# Patient Record
Sex: MALE | Employment: UNEMPLOYED | URBAN - METROPOLITAN AREA
[De-identification: names, ages, dates, MRNs, and addresses within clinical notes are randomized per-mention and may not be internally consistent; named-entity substitution may affect disease eponyms.]

---

## 2023-01-18 ENCOUNTER — OFFICE VISIT (OUTPATIENT)
Dept: FAMILY MEDICINE | Facility: CLINIC | Age: 38
End: 2023-01-18
Payer: MEDICAID

## 2023-01-18 ENCOUNTER — HOSPITAL ENCOUNTER (OUTPATIENT)
Dept: RADIOLOGY | Facility: HOSPITAL | Age: 38
Discharge: HOME | End: 2023-01-18
Payer: MEDICAID

## 2023-01-18 VITALS
SYSTOLIC BLOOD PRESSURE: 115 MMHG | HEIGHT: 72 IN | BODY MASS INDEX: 25.69 KG/M2 | OXYGEN SATURATION: 99 % | HEART RATE: 60 BPM | DIASTOLIC BLOOD PRESSURE: 75 MMHG | RESPIRATION RATE: 18 BRPM | TEMPERATURE: 98 F | WEIGHT: 189.69 LBS

## 2023-01-18 DIAGNOSIS — G89.29 CHRONIC MIDLINE LOW BACK PAIN WITHOUT SCIATICA: ICD-10-CM

## 2023-01-18 DIAGNOSIS — M54.50 CHRONIC MIDLINE LOW BACK PAIN WITHOUT SCIATICA: ICD-10-CM

## 2023-01-18 DIAGNOSIS — G89.29 CHRONIC RIGHT SHOULDER PAIN: ICD-10-CM

## 2023-01-18 DIAGNOSIS — M54.2 CHRONIC NECK PAIN: ICD-10-CM

## 2023-01-18 DIAGNOSIS — Z87.09 HISTORY OF ASTHMA: ICD-10-CM

## 2023-01-18 DIAGNOSIS — Z91.09 ENVIRONMENTAL ALLERGIES: ICD-10-CM

## 2023-01-18 DIAGNOSIS — Z00.00 ENCOUNTER FOR WELLNESS EXAMINATION: Primary | ICD-10-CM

## 2023-01-18 DIAGNOSIS — G89.29 CHRONIC NECK PAIN: ICD-10-CM

## 2023-01-18 DIAGNOSIS — L70.9 ACNE, UNSPECIFIED ACNE TYPE: ICD-10-CM

## 2023-01-18 DIAGNOSIS — M25.511 CHRONIC RIGHT SHOULDER PAIN: ICD-10-CM

## 2023-01-18 PROBLEM — J45.20 MILD INTERMITTENT ASTHMA WITHOUT COMPLICATION: Chronic | Status: ACTIVE | Noted: 2023-01-18

## 2023-01-18 PROBLEM — J45.20 MILD INTERMITTENT ASTHMA WITHOUT COMPLICATION: Status: ACTIVE | Noted: 2023-01-18

## 2023-01-18 LAB
ALBUMIN SERPL-MCNC: 4.6 G/DL (ref 3.5–5)
ALBUMIN/GLOB SERPL: 1.2 RATIO (ref 1.1–2)
ALP SERPL-CCNC: 58 UNIT/L (ref 40–150)
ALT SERPL-CCNC: 28 UNIT/L (ref 0–55)
APPEARANCE UR: CLEAR
AST SERPL-CCNC: 23 UNIT/L (ref 5–34)
BACTERIA #/AREA URNS AUTO: NORMAL /HPF
BASOPHILS # BLD AUTO: 0.02 X10(3)/MCL (ref 0–0.2)
BASOPHILS NFR BLD AUTO: 0.4 %
BILIRUB UR QL STRIP.AUTO: NEGATIVE MG/DL
BILIRUBIN DIRECT+TOT PNL SERPL-MCNC: 0.8 MG/DL
BUN SERPL-MCNC: 9.9 MG/DL (ref 8.9–20.6)
CALCIUM SERPL-MCNC: 10.1 MG/DL (ref 8.4–10.2)
CHLORIDE SERPL-SCNC: 104 MMOL/L (ref 98–107)
CHOLEST SERPL-MCNC: 219 MG/DL
CHOLEST/HDLC SERPL: 5 {RATIO} (ref 0–5)
CO2 SERPL-SCNC: 27 MMOL/L (ref 22–29)
COLOR UR AUTO: NORMAL
CREAT SERPL-MCNC: 0.95 MG/DL (ref 0.73–1.18)
EOSINOPHIL # BLD AUTO: 0.11 X10(3)/MCL (ref 0–0.9)
EOSINOPHIL NFR BLD AUTO: 2.2 %
ERYTHROCYTE [DISTWIDTH] IN BLOOD BY AUTOMATED COUNT: 13.3 % (ref 11.5–17)
EST. AVERAGE GLUCOSE BLD GHB EST-MCNC: 105.4 MG/DL
GFR SERPLBLD CREATININE-BSD FMLA CKD-EPI: >60 MLS/MIN/1.73/M2
GLOBULIN SER-MCNC: 3.7 GM/DL (ref 2.4–3.5)
GLUCOSE SERPL-MCNC: 91 MG/DL (ref 74–100)
GLUCOSE UR QL STRIP.AUTO: NORMAL MG/DL
HAV IGM SERPL QL IA: NONREACTIVE
HBA1C MFR BLD: 5.3 %
HBV CORE IGM SERPL QL IA: NONREACTIVE
HBV SURFACE AG SERPL QL IA: NONREACTIVE
HCT VFR BLD AUTO: 45.2 % (ref 42–52)
HCV AB SERPL QL IA: NONREACTIVE
HDLC SERPL-MCNC: 48 MG/DL (ref 35–60)
HGB BLD-MCNC: 14.9 GM/DL (ref 14–18)
HIV 1+2 AB+HIV1 P24 AG SERPL QL IA: NONREACTIVE
HYALINE CASTS #/AREA URNS LPF: NORMAL /LPF
IMM GRANULOCYTES # BLD AUTO: 0.01 X10(3)/MCL (ref 0–0.04)
IMM GRANULOCYTES NFR BLD AUTO: 0.2 %
KETONES UR QL STRIP.AUTO: NEGATIVE MG/DL
LDLC SERPL CALC-MCNC: 138 MG/DL (ref 50–140)
LEUKOCYTE ESTERASE UR QL STRIP.AUTO: NEGATIVE UNIT/L
LYMPHOCYTES # BLD AUTO: 2.02 X10(3)/MCL (ref 0.6–4.6)
LYMPHOCYTES NFR BLD AUTO: 40.6 %
MCH RBC QN AUTO: 25.2 PG
MCHC RBC AUTO-ENTMCNC: 33 MG/DL (ref 33–36)
MCV RBC AUTO: 76.4 FL (ref 80–94)
MONOCYTES # BLD AUTO: 0.3 X10(3)/MCL (ref 0.1–1.3)
MONOCYTES NFR BLD AUTO: 6 %
NEUTROPHILS # BLD AUTO: 2.51 X10(3)/MCL (ref 2.1–9.2)
NEUTROPHILS NFR BLD AUTO: 50.6 %
NITRITE UR QL STRIP.AUTO: NEGATIVE
NRBC BLD AUTO-RTO: 0 %
PH UR STRIP.AUTO: 6.5 [PH]
PLATELET # BLD AUTO: 299 X10(3)/MCL (ref 130–400)
PMV BLD AUTO: 10.6 FL (ref 7.4–10.4)
POTASSIUM SERPL-SCNC: 3.9 MMOL/L (ref 3.5–5.1)
PROT SERPL-MCNC: 8.3 GM/DL (ref 6.4–8.3)
PROT UR QL STRIP.AUTO: NEGATIVE MG/DL
RBC # BLD AUTO: 5.92 X10(6)/MCL (ref 4.7–6.1)
RBC #/AREA URNS AUTO: NORMAL /HPF
RBC UR QL AUTO: NEGATIVE UNIT/L
SODIUM SERPL-SCNC: 139 MMOL/L (ref 136–145)
SP GR UR STRIP.AUTO: 1.01
SQUAMOUS #/AREA URNS LPF: NORMAL /HPF
T PALLIDUM AB SER QL: NONREACTIVE
T4 FREE SERPL-MCNC: 1.11 NG/DL (ref 0.7–1.48)
TRIGL SERPL-MCNC: 164 MG/DL (ref 34–140)
TSH SERPL-ACNC: 1.12 UIU/ML (ref 0.35–4.94)
UROBILINOGEN UR STRIP-ACNC: NORMAL MG/DL
VLDLC SERPL CALC-MCNC: 33 MG/DL
WBC # SPEC AUTO: 5 X10(3)/MCL (ref 4.5–11.5)
WBC #/AREA URNS AUTO: NORMAL /HPF

## 2023-01-18 PROCEDURE — 73030 X-RAY EXAM OF SHOULDER: CPT | Mod: TC,PN,RT

## 2023-01-18 PROCEDURE — 3008F BODY MASS INDEX DOCD: CPT | Mod: CPTII,,,

## 2023-01-18 PROCEDURE — 1160F PR REVIEW ALL MEDS BY PRESCRIBER/CLIN PHARMACIST DOCUMENTED: ICD-10-PCS | Mod: CPTII,,,

## 2023-01-18 PROCEDURE — 3008F PR BODY MASS INDEX (BMI) DOCUMENTED: ICD-10-PCS | Mod: CPTII,,,

## 2023-01-18 PROCEDURE — 81001 URINALYSIS AUTO W/SCOPE: CPT

## 2023-01-18 PROCEDURE — 3078F DIAST BP <80 MM HG: CPT | Mod: CPTII,,,

## 2023-01-18 PROCEDURE — 1159F PR MEDICATION LIST DOCUMENTED IN MEDICAL RECORD: ICD-10-PCS | Mod: CPTII,,,

## 2023-01-18 PROCEDURE — 86780 TREPONEMA PALLIDUM: CPT

## 2023-01-18 PROCEDURE — 85025 COMPLETE CBC W/AUTO DIFF WBC: CPT

## 2023-01-18 PROCEDURE — 80074 ACUTE HEPATITIS PANEL: CPT

## 2023-01-18 PROCEDURE — 80061 LIPID PANEL: CPT

## 2023-01-18 PROCEDURE — 99385 PREV VISIT NEW AGE 18-39: CPT | Mod: S$PBB,,,

## 2023-01-18 PROCEDURE — 3078F PR MOST RECENT DIASTOLIC BLOOD PRESSURE < 80 MM HG: ICD-10-PCS | Mod: CPTII,,,

## 2023-01-18 PROCEDURE — 84439 ASSAY OF FREE THYROXINE: CPT

## 2023-01-18 PROCEDURE — 84443 ASSAY THYROID STIM HORMONE: CPT

## 2023-01-18 PROCEDURE — 3074F PR MOST RECENT SYSTOLIC BLOOD PRESSURE < 130 MM HG: ICD-10-PCS | Mod: CPTII,,,

## 2023-01-18 PROCEDURE — 99385 PR PREVENTIVE VISIT,NEW,18-39: ICD-10-PCS | Mod: S$PBB,,,

## 2023-01-18 PROCEDURE — 1159F MED LIST DOCD IN RCRD: CPT | Mod: CPTII,,,

## 2023-01-18 PROCEDURE — 80053 COMPREHEN METABOLIC PANEL: CPT

## 2023-01-18 PROCEDURE — 99204 OFFICE O/P NEW MOD 45 MIN: CPT | Mod: PBBFAC,PN

## 2023-01-18 PROCEDURE — 1160F RVW MEDS BY RX/DR IN RCRD: CPT | Mod: CPTII,,,

## 2023-01-18 PROCEDURE — 87389 HIV-1 AG W/HIV-1&-2 AB AG IA: CPT

## 2023-01-18 PROCEDURE — 3074F SYST BP LT 130 MM HG: CPT | Mod: CPTII,,,

## 2023-01-18 PROCEDURE — 36415 COLL VENOUS BLD VENIPUNCTURE: CPT

## 2023-01-18 PROCEDURE — 83036 HEMOGLOBIN GLYCOSYLATED A1C: CPT

## 2023-01-18 RX ORDER — TRETINOIN 1 MG/G
CREAM TOPICAL NIGHTLY
Qty: 45 G | Refills: 2 | Status: SHIPPED | OUTPATIENT
Start: 2023-01-18 | End: 2024-02-05 | Stop reason: SDUPTHER

## 2023-01-18 RX ORDER — FLUTICASONE PROPIONATE 50 MCG
1 SPRAY, SUSPENSION (ML) NASAL DAILY
Qty: 18.2 ML | Refills: 3 | Status: SHIPPED | OUTPATIENT
Start: 2023-01-18

## 2023-01-18 RX ORDER — LORATADINE 10 MG/1
10 TABLET ORAL DAILY
Qty: 30 TABLET | Refills: 3 | Status: SHIPPED | OUTPATIENT
Start: 2023-01-18

## 2023-01-18 RX ORDER — ALBUTEROL SULFATE 90 UG/1
2 AEROSOL, METERED RESPIRATORY (INHALATION) EVERY 6 HOURS PRN
Qty: 18 G | Refills: 6 | Status: SHIPPED | OUTPATIENT
Start: 2023-01-18

## 2023-01-18 NOTE — ASSESSMENT & PLAN NOTE
Continue NSAID/muscle relaxer as needed.   Neck stretches daily.  Avoid activities that make pain and stiffness worse.  Heating pad, Ice pack, and Biofreeze as needed alternate every 15-20 minutes.  Massage neck to loosen muscles

## 2023-01-18 NOTE — ASSESSMENT & PLAN NOTE
Lower back stretches daily.  Avoid strenuous lifting, use proper body mechanics.  Heating pad, Ice pack, Biofreeze or Epsom salt baths as needed.  Exercise to strengthen core muscles to support your back.  PT Referral given.

## 2023-01-18 NOTE — PROGRESS NOTES
Patient Name: Nick Valente   : 1985  MRN: [id number]     Subjective:   Patient ID: Nick Valente is a 37 y.o. male.    Chief Complaint:   Chief Complaint   Patient presents with    [facility]        HPI: 2023:  Patient presents to [facility] today, no recent previous primary care provider.  Patient's last provider was in [place] where he last lived.  Patient was being treated for right shoulder pain while living in [place], states that he had received steroid injections in his right shoulder joint, states that it did provide some relief but the next plan of care was surgical options.  Patient moved and never followed up on this issue.  Patient lives active lifestyle, enjoys surfing.  Notices pain and compensation associated with this injury.  Has never performed physical therapy previously but is open to referral.  As well as right shoulder pain he also complains of neck pain and back pain.  Patient states that at 1 point in time he did report to emergency department for back pain as it had gotten so severe his legs gave out.  No imaging was done at this time.  Patient has not had episode where legs give out but does endorse seeing back pain.  Patient states that at worst his pain is an 8 but is consistently about a 3/10.  Patient utilizes over-the-counter medications currently to help with pain states that it helps provide some relief.  Patient further states that he feels congestion in his right ear, it is most noticeable when he puts on his headphones to play video games as noises are muffled and it feels weird.  Denies any tinnitus or drainage from ear.  Patient also states that he needs albuterol inhaler as his last 1 has  and he likes having 1 just in case.  Patient has not had any complications or had to use albuterol inhaler in 2 years but did have asthma in childhood.  Patient requesting refill of tretinoin when cream.  He has been utilizing this medicine to help with  acne.      ROS:  Review of Systems   Constitutional:  Negative for chills, fever and weight loss.   HENT:  Positive for congestion and ear pain. Negative for ear discharge, nosebleeds and tinnitus.    Eyes:  Negative for blurred vision, photophobia and pain.   Respiratory:  Negative for cough, shortness of breath, wheezing and stridor.    Cardiovascular:  Negative for chest pain, palpitations and orthopnea.   Gastrointestinal:  Negative for abdominal pain, heartburn and nausea.   Genitourinary:  Negative for dysuria, frequency, hematuria and urgency.   Musculoskeletal:  Positive for joint pain. Negative for falls and myalgias.   Skin:  Negative for itching and rash.   Neurological:  Negative for dizziness, sensory change, speech change, focal weakness, seizures, weakness and headaches.   Endo/Heme/Allergies:  Negative for environmental allergies. Does not bruise/bleed easily.   Psychiatric/Behavioral:  Negative for hallucinations and suicidal ideas.     History:   History reviewed. No pertinent past medical history.   History reviewed. No pertinent surgical history.  History reviewed. No pertinent family history.   Social History     Tobacco Use    Smoking status: Never    Smokeless tobacco: Never   Substance and Sexual Activity    Alcohol use: Not Currently    Drug use: Never    Sexual activity: Not Currently     Partners: Female        Allergies: Review of patient's allergies indicates:  No Known Allergies  Objective:     Vitals:    01/18/23 1327   BP: 115/75   Pulse: 60   Resp: 18   Temp: 98 °F (36.7 °C)   SpO2: 99%   Weight: 86 kg (189 lb 11.2 oz)   Height: 6' (1.829 m)     Body mass index is 25.73 kg/m².     Physical Examination:   Physical Exam  Vitals reviewed.   Constitutional:       Appearance: Normal appearance. He is normal weight.   HENT:      Head: Normocephalic.      Right Ear: Tympanic membrane, ear canal and external ear normal.      Left Ear: Tympanic membrane, ear canal and external ear normal.       Nose: Nose normal.      Mouth/Throat:      Mouth: Mucous membranes are moist.      Pharynx: Oropharynx is clear.   Eyes:      Extraocular Movements: Extraocular movements intact.      Conjunctiva/sclera: Conjunctivae normal.      Pupils: Pupils are equal, round, and reactive to light.   Cardiovascular:      Rate and Rhythm: Normal rate and regular rhythm.      Pulses: Normal pulses.      Heart sounds: Normal heart sounds.   Pulmonary:      Effort: Pulmonary effort is normal.      Breath sounds: Normal breath sounds.   Abdominal:      General: Abdomen is flat. Bowel sounds are normal.      Palpations: Abdomen is soft.   Musculoskeletal:      Right shoulder: Crepitus present. Decreased range of motion. Normal strength.      Left shoulder: Normal strength.      Cervical back: Normal range of motion and neck supple. Pain with movement present.      Lumbar back: Spasms present. Decreased range of motion. Negative right straight leg raise test and negative left straight leg raise test.   Skin:     General: Skin is warm and dry.   Neurological:      General: No focal deficit present.      Mental Status: He is alert and oriented to person, place, and time.   Psychiatric:         Mood and Affect: Mood normal.         Behavior: Behavior normal.       Assessment:     Problem List Items Addressed This Visit          Pulmonary    History of asthma (Chronic)    Current Assessment & Plan     Refill of albuterol sent to pharmacy.  Patient's last albuterol inhaler  previously.         Relevant Medications    albuterol (VENTOLIN HFA) 90 mcg/actuation inhaler       Orthopedic    Chronic neck pain (Chronic)    Current Assessment & Plan     Continue NSAID/muscle relaxer as needed.   Neck stretches daily.  Avoid activities that make pain and stiffness worse.  Heating pad, Ice pack, and Biofreeze as needed alternate every 15-20 minutes.  Massage neck to loosen muscles           Relevant Orders    Ambulatory referral/consult to  Physical/Occupational Therapy    Chronic low back pain (Chronic)    Current Assessment & Plan     Lower back stretches daily.  Avoid strenuous lifting, use proper body mechanics.  Heating pad, Ice pack, Biofreeze or Epsom salt baths as needed.  Exercise to strengthen core muscles to support your back.  PT Referral given.             Relevant Orders    Ambulatory referral/consult to Physical/Occupational Therapy    Chronic right shoulder pain (Chronic)    Current Assessment & Plan     Take pain medications as prescribed.  Stay active. Having strong muscles takes the strain off of your joints, which can help reduce your pain.  Rest for several minutes when your pain is at its worst.  Alternate hot and cold packs as needed for pain relief.           Relevant Orders    Ambulatory referral/consult to Physical/Occupational Therapy    X-ray Shoulder 2 or More Views Right    Ambulatory referral/consult to Orthopedics       Other    Environmental allergies (Chronic)    Relevant Medications    fluticasone propionate (FLONASE) 50 mcg/actuation nasal spray    loratadine (CLARITIN) 10 mg tablet     Other Visit Diagnoses       Encounter for wellness examination    -  Primary    Relevant Orders    TSH    T4, Free    Hemoglobin A1C    SYPHILIS ANTIBODY (WITH REFLEX RPR)    Hepatitis Panel, Acute    Lipid Panel    CBC Auto Differential    Comprehensive Metabolic Panel    HIV 1/2 Ag/Ab (4th Gen)    Urinalysis, Reflex to Urine Culture    Acne, unspecified acne type        Relevant Medications    tretinoin (RETIN-A) 0.1 % cream            Plan:   Nick was seen today for establish care.    Diagnoses and all orders for this visit:    Encounter for wellness examination  -     TSH  -     T4, Free  -     Hemoglobin A1C  -     SYPHILIS ANTIBODY (WITH REFLEX RPR)  -     Hepatitis Panel, Acute  -     Lipid Panel  -     CBC Auto Differential  -     Comprehensive Metabolic Panel  -     HIV 1/2 Ag/Ab (4th Gen)  -     Urinalysis, Reflex to Urine  Culture    Chronic neck pain  -     Ambulatory referral/consult to Physical/Occupational Therapy; Future    Chronic midline low back pain without sciatica  -     Ambulatory referral/consult to Physical/Occupational Therapy; Future    Chronic right shoulder pain  -     Ambulatory referral/consult to Physical/Occupational Therapy; Future  -     X-ray Shoulder 2 or More Views Right  -     Ambulatory referral/consult to Orthopedics; Future    History of asthma  -     albuterol (VENTOLIN HFA) 90 mcg/actuation inhaler; Inhale 2 puffs into the lungs every 6 (six) hours as needed for Wheezing. Rescue    Environmental allergies  -     fluticasone propionate (FLONASE) 50 mcg/actuation nasal spray; 1 spray (50 mcg total) by Each Nostril route once daily.  -     loratadine (CLARITIN) 10 mg tablet; Take 1 tablet (10 mg total) by mouth once daily.    Acne, unspecified acne type  -     tretinoin (RETIN-A) 0.1 % cream; Apply topically every evening.       Follow up in about 2 weeks (around 2/1/2023) for review labs.     This note was created with the assistance of Dragon voice recognition software or phone dictation. There may be transcription errors as a result of using this technology however minimal. Effort has been made to assure accuracy of transcription but any obvious errors or omissions should be clarified with the author of the document

## 2023-01-18 NOTE — ASSESSMENT & PLAN NOTE
Take pain medications as prescribed.  Stay active. Having strong muscles takes the strain off of your joints, which can help reduce your pain.  Rest for several minutes when your pain is at its worst.  Alternate hot and cold packs as needed for pain relief.

## 2023-01-20 LAB — PATH REV: NORMAL

## 2023-02-06 ENCOUNTER — OFFICE VISIT (OUTPATIENT)
Dept: FAMILY MEDICINE | Facility: CLINIC | Age: 38
End: 2023-02-06
Payer: MEDICAID

## 2023-02-06 VITALS
RESPIRATION RATE: 20 BRPM | WEIGHT: 185.13 LBS | TEMPERATURE: 98 F | HEIGHT: 72 IN | BODY MASS INDEX: 25.07 KG/M2 | HEART RATE: 68 BPM | OXYGEN SATURATION: 97 % | DIASTOLIC BLOOD PRESSURE: 83 MMHG | SYSTOLIC BLOOD PRESSURE: 118 MMHG

## 2023-02-06 DIAGNOSIS — G89.29 CHRONIC PAIN OF RIGHT KNEE: Primary | ICD-10-CM

## 2023-02-06 DIAGNOSIS — Z98.890 HISTORY OF REPAIR OF ACL: ICD-10-CM

## 2023-02-06 DIAGNOSIS — H93.12 TINNITUS OF LEFT EAR: ICD-10-CM

## 2023-02-06 DIAGNOSIS — M25.561 CHRONIC PAIN OF RIGHT KNEE: Primary | ICD-10-CM

## 2023-02-06 PROCEDURE — 1159F MED LIST DOCD IN RCRD: CPT | Mod: CPTII,,,

## 2023-02-06 PROCEDURE — 1160F RVW MEDS BY RX/DR IN RCRD: CPT | Mod: CPTII,,,

## 2023-02-06 PROCEDURE — 99214 OFFICE O/P EST MOD 30 MIN: CPT | Mod: PBBFAC,PN

## 2023-02-06 PROCEDURE — 3074F PR MOST RECENT SYSTOLIC BLOOD PRESSURE < 130 MM HG: ICD-10-PCS | Mod: CPTII,,,

## 2023-02-06 PROCEDURE — 1160F PR REVIEW ALL MEDS BY PRESCRIBER/CLIN PHARMACIST DOCUMENTED: ICD-10-PCS | Mod: CPTII,,,

## 2023-02-06 PROCEDURE — 99213 OFFICE O/P EST LOW 20 MIN: CPT | Mod: S$PBB,,,

## 2023-02-06 PROCEDURE — 3008F PR BODY MASS INDEX (BMI) DOCUMENTED: ICD-10-PCS | Mod: CPTII,,,

## 2023-02-06 PROCEDURE — 3008F BODY MASS INDEX DOCD: CPT | Mod: CPTII,,,

## 2023-02-06 PROCEDURE — 3079F PR MOST RECENT DIASTOLIC BLOOD PRESSURE 80-89 MM HG: ICD-10-PCS | Mod: CPTII,,,

## 2023-02-06 PROCEDURE — 1159F PR MEDICATION LIST DOCUMENTED IN MEDICAL RECORD: ICD-10-PCS | Mod: CPTII,,,

## 2023-02-06 PROCEDURE — 3079F DIAST BP 80-89 MM HG: CPT | Mod: CPTII,,,

## 2023-02-06 PROCEDURE — 99213 PR OFFICE/OUTPT VISIT, EST, LEVL III, 20-29 MIN: ICD-10-PCS | Mod: S$PBB,,,

## 2023-02-06 PROCEDURE — 3074F SYST BP LT 130 MM HG: CPT | Mod: CPTII,,,

## 2023-02-06 NOTE — PROGRESS NOTES
Patient Name: Nick Valente   : 1985  MRN: [id number]     Subjective:   Patient ID: Nick Valente is a 37 y.o. male.    Chief Complaint:   Chief Complaint   Patient presents with    Follow-up        HPI: 2023:  Patient presents today to review labs, discussed result with patient and all questions answered to patient's understanding.  Patient starts physical therapy today for shoulder and low back pain, we will be adding referral for right knee pain.  Patient has history of ACL repair when he was 16 years old, states that his knee feels strong but he gets nervous when he runs because he feels lots of pain and knee after.  Denies any twisting, turning, pulling or sharp shooting pains and knee.  Patient states since utilizing allergy medications that he notices improvement in congestion and muffled hearing in ear, does endorse tinnitus of left ear.  He is amenable to audiology referral today.    2023:  Patient presents to establish care today, no recent previous primary care provider.  Patient's last provider was in [place] where he last lived.  Patient was being treated for right shoulder pain while living in [place], states that he had received steroid injections in his right shoulder joint, states that it did provide some relief but the next plan of care was surgical options.  Patient moved and never followed up on this issue.  Patient lives active lifestyle, enjoys surfing.  Notices pain and compensation associated with this injury.  Has never performed physical therapy previously but is open to referral.  As well as right shoulder pain he also complains of neck pain and back pain.  Patient states that at 1 point in time he did report to emergency department for back pain as it had gotten so severe his legs gave out.  No imaging was done at this time.  Patient has not had episode where legs give out but does endorse seeing back pain.  Patient states that at worst his pain is an 8 but is consistently  about a 3/10.  Patient utilizes over-the-counter medications currently to help with pain states that it helps provide some relief.  Patient further states that he feels congestion in his right ear, it is most noticeable when he puts on his headphones to play video games as noises are muffled and it feels weird.  Denies any tinnitus or drainage from ear.  Patient also states that he needs albuterol inhaler as his last 1 has  and he likes having 1 just in case.  Patient has not had any complications or had to use albuterol inhaler in 2 years but did have asthma in childhood.  Patient requesting refill of tretinoin when cream.  He has been utilizing this medicine to help with acne.      ROS:  Review of Systems   Constitutional:  Negative for chills, fever and weight loss.   HENT:  Positive for ear pain and tinnitus. Negative for ear discharge and nosebleeds.    Eyes:  Negative for blurred vision, photophobia and pain.   Respiratory:  Negative for cough, shortness of breath, wheezing and stridor.    Cardiovascular:  Negative for chest pain, palpitations and orthopnea.   Gastrointestinal:  Negative for abdominal pain, heartburn and nausea.   Genitourinary:  Negative for dysuria, frequency, hematuria and urgency.   Musculoskeletal:  Negative for falls and myalgias.   Skin:  Negative for itching and rash.   Neurological:  Negative for dizziness, sensory change, speech change, focal weakness, seizures, weakness and headaches.   Endo/Heme/Allergies:  Negative for environmental allergies. Does not bruise/bleed easily.   Psychiatric/Behavioral:  Negative for hallucinations and suicidal ideas.     History:   History reviewed. No pertinent past medical history.   History reviewed. No pertinent surgical history.  History reviewed. No pertinent family history.   Social History     Tobacco Use    Smoking status: Never    Smokeless tobacco: Never   Substance and Sexual Activity    Alcohol use: Not Currently    Drug use: Never     Sexual activity: Not Currently     Partners: Female        Allergies: Review of patient's allergies indicates:  No Known Allergies  Objective:     Vitals:    02/06/23 1333   BP: 118/83   Pulse: 68   Resp: 20   Temp: 98.4 °F (36.9 °C)   SpO2: 97%   Weight: 84 kg (185 lb 1.6 oz)   Height: 6' (1.829 m)     Body mass index is 25.1 kg/m².     Physical Examination:   Physical Exam  Constitutional:       General: He is not in acute distress.     Appearance: Normal appearance. He is not ill-appearing.   HENT:      Right Ear: Tympanic membrane, ear canal and external ear normal. There is no impacted cerumen.      Left Ear: Tympanic membrane, ear canal and external ear normal. There is no impacted cerumen.   Cardiovascular:      Rate and Rhythm: Normal rate and regular rhythm.      Heart sounds: Normal heart sounds.   Pulmonary:      Effort: Pulmonary effort is normal. No respiratory distress.      Breath sounds: Normal breath sounds.   Musculoskeletal:      Right shoulder: Decreased range of motion.      Cervical back: Normal range of motion.      Lumbar back: Spasms present. Decreased range of motion.      Right knee: Normal.      Left knee: Normal.   Skin:     General: Skin is warm and dry.   Neurological:      Mental Status: He is alert and oriented to person, place, and time.   Psychiatric:         Mood and Affect: Mood normal.         Behavior: Behavior normal.       Assessment:     Problem List Items Addressed This Visit    None  Visit Diagnoses       Chronic pain of right knee    -  Primary    Relevant Orders    Ambulatory referral/consult to Physical/Occupational Therapy    History of repair of ACL        Relevant Orders    Ambulatory referral/consult to Physical/Occupational Therapy    Tinnitus of left ear        Relevant Orders    Ambulatory referral/consult to Audiology            Plan:   Nick was seen today for follow-up.    Diagnoses and all orders for this visit:    Chronic pain of right knee  -      Ambulatory referral/consult to Physical/Occupational Therapy; Future    History of repair of ACL  -     Ambulatory referral/consult to Physical/Occupational Therapy; Future    Tinnitus of left ear  -     Ambulatory referral/consult to Audiology; Future       Follow up in January of 2024 for annual exam, Sooner if needed.     This note was created with the assistance of Dragon voice recognition software or phone dictation. There may be transcription errors as a result of using this technology however minimal. Effort has been made to assure accuracy of transcription but any obvious errors or omissions should be clarified with the author of the document      I spent a total of 25 minutes on the day of the visit.This includes face to face time and non-face to face time preparing to see the patient (eg, review of tests), obtaining and/or reviewing separately obtained history, documenting clinical information in the electronic or other health record, independently interpreting results and communicating results to the patient/family/caregiver, or care coordinator.

## 2023-03-13 ENCOUNTER — HOSPITAL ENCOUNTER (OUTPATIENT)
Dept: RADIOLOGY | Facility: HOSPITAL | Age: 38
Discharge: HOME | End: 2023-03-13
Attending: STUDENT IN AN ORGANIZED HEALTH CARE EDUCATION/TRAINING PROGRAM
Payer: MEDICAID

## 2023-03-13 ENCOUNTER — OFFICE VISIT (OUTPATIENT)
Dept: ORTHOPEDICS | Facility: CLINIC | Age: 38
End: 2023-03-13
Payer: MEDICAID

## 2023-03-13 VITALS
SYSTOLIC BLOOD PRESSURE: 119 MMHG | DIASTOLIC BLOOD PRESSURE: 76 MMHG | WEIGHT: 191.19 LBS | BODY MASS INDEX: 25.9 KG/M2 | HEART RATE: 64 BPM | HEIGHT: 72 IN

## 2023-03-13 DIAGNOSIS — G89.29 CHRONIC RIGHT SHOULDER PAIN: ICD-10-CM

## 2023-03-13 DIAGNOSIS — M75.41 SUBACROMIAL IMPINGEMENT OF RIGHT SHOULDER: Primary | ICD-10-CM

## 2023-03-13 DIAGNOSIS — M25.511 CHRONIC RIGHT SHOULDER PAIN: ICD-10-CM

## 2023-03-13 PROCEDURE — 96372 THER/PROPH/DIAG INJ SC/IM: CPT | Mod: PBBFAC

## 2023-03-13 PROCEDURE — 73030 X-RAY EXAM OF SHOULDER: CPT | Mod: TC,RT

## 2023-03-13 PROCEDURE — 99213 OFFICE O/P EST LOW 20 MIN: CPT | Mod: PBBFAC

## 2023-03-13 PROCEDURE — 20610 DRAIN/INJ JOINT/BURSA W/O US: CPT | Mod: PBBFAC | Performed by: STUDENT IN AN ORGANIZED HEALTH CARE EDUCATION/TRAINING PROGRAM

## 2023-03-13 RX ORDER — TRIAMCINOLONE ACETONIDE 40 MG/ML
40 INJECTION, SUSPENSION INTRA-ARTICULAR; INTRAMUSCULAR ONCE
Status: COMPLETED | OUTPATIENT
Start: 2023-03-13 | End: 2023-03-13

## 2023-03-13 RX ORDER — LIDOCAINE HYDROCHLORIDE 10 MG/ML
5 INJECTION, SOLUTION EPIDURAL; INFILTRATION; INTRACAUDAL; PERINEURAL
Status: COMPLETED | OUTPATIENT
Start: 2023-03-13 | End: 2023-03-13

## 2023-03-13 RX ADMIN — LIDOCAINE HYDROCHLORIDE 50 MG: 10 INJECTION, SOLUTION EPIDURAL; INFILTRATION; INTRACAUDAL; PERINEURAL at 01:03

## 2023-03-13 RX ADMIN — TRIAMCINOLONE ACETONIDE 40 MG: 40 INJECTION, SUSPENSION INTRA-ARTICULAR; INTRAMUSCULAR at 01:03

## 2023-03-13 NOTE — PROGRESS NOTES
Subjective:    Patient ID: Nick Valente is a right handed 37 y.o. male  who presented to [facility] for new visit..      Chief Complaint: Pain of the Right Shoulder      History of Present Illness:  Nick Valente who has a history of rotator cuff tendinopathy  presented today for  shoulder pain involving the right for the past 1 year +. Pain is located at lateral acromion, deltoid muscle. Quality of pain is described as aching.  Inciting event:  Feels like pain started with him doing shoulder presses years ago .  Pain is aggravated by reaching, repetitive use, work at or above shoulder height, swimming / surfing . Patient has had prior shoulder problems. Evaluation to date: plain films and Ortho evaluation. Treatment to date: avoidance of activity, oral analgesics, and CSI. Expectations for today's visit includes: formal evaluation.  Occupation includes: physician. PCP is: Alecia Mg NP.    Shoulder Review of Systems:  Swelling?  no  Instability?  no  Clicking?  no  Limited ROM? no  Fever/Chills? no  Subluxation? no  Dislocation? no    Current Choice of Exercise: Swimming / Surfing / Weight Lifting      Objective:      Physical Exam:  /76   Pulse 64   Ht 6' (1.829 m)   Wt 86.7 kg (191 lb 3.2 oz)   BMI 25.93 kg/m²     Appearance:  Soft tissue swelling: Left: no Right: no  Effusion: Left:  Negative Right: Negative  Erythema: Left no Right: no  Ecchymosis: Left: no Right: no  Atrophy: Left: no Right: no  Scapular winging: Left: no Right: no    Palpation:  Shoulder Tenderness: Left: none  Right: lateral acromion, deltoid muscle    Range of motion:  Flexion (0-90): Left:  90 Right: 90  Abduction (0-180): Left:  180 Right: 180  External rotation (0-55): Left: 55 Right: 55  Internal rotation (0-45): Left: 45 Left: 45    Strength:  Abduction: Left: 5/5 Pain: no Right: 5/5 Pain: yes  External rotation: Left: 5/5 Pain: no Right: 5/5 Pain: no  Internal  rotation: Left: 5/5 Pain: no Right: 5/5 Pain: no  Elbow flexion: Left: 5/5 Pain: no Right: 5/5 Pain: no  Elbow extension: Left: 5/5 Pain: no Right: 5/5 Pain: no    Special Tests:  Subacromial Impingement  Neer: Left: Negative Right: Positive  Sofia: Left: Negative Right: Positive    AC Joint Arthritis:  Cross-body abduction: Left: Negative Right: Negative    Rotator Cuff Tear   Drop arm test: Left: Negative Right: Negative  Hornblower: Left: Left: Not performed Right: Not performed   Belly press test: Left: Negative Right: Negative  Jia test (Empty can): Left: Negative Right: Negative    Biceps tendon/Labral tear  Speed's: Left: Negative Right: Negative  Yergason's: Left: Negative Right: Negative  O'Favio's: Left: Negative Right: Negative  Cranck test: Left: Negative Right: Negative    Stability   Sulcus sign: Left: Not performed Right: Not performed   Apprehension test: Left: Not performed Right: Not performed   Relocation test: Left: Not performed Right: Not performed     Cervical   Spurling: Left: Negative Right: Negative    AIN/PIN/Radial nerve: Intact and symmetric    General appearance: NAD  Peripheral pulses: normal bilaterally   Reflexes: Left: Not performed Right: Not performed   Sensation: normal    Labs:  Last A1c: The patient doesn't have any registry metric data available     Imaging:   Previous images reviewed.  X-rays ordered and performed today: yes  # of views: 4 Laterality: right  My Interpretation:  No acute fracture or dislocation. Preserved GH joint space without any cortical irregularities or osteophytes.      Assessment:        Encounter Diagnoses   Code Name Primary?    M75.41 Subacromial impingement of right shoulder Yes    M25.511, G89.29 Chronic right shoulder pain           Plan:           Orders Placed This Encounter   Procedures    Large Joint Aspiration/Injection     This order was created via procedure documentation    X-ray Shoulder 2 or More Views Right     Standing Status:    Future     Number of Occurrences:   1     Standing Expiration Date:   3/13/2024     Order Specific Question:   May the Radiologist modify the order per protocol to meet the clinical needs of the patient?     Answer:   Yes     Order Specific Question:   Release to patient     Answer:   Immediate     Medications Ordered This Encounter   Medications    LIDOcaine (PF) 10 mg/ml (1%) injection 50 mg    triamcinolone acetonide injection 40 mg     Dx: right subacromial impingement Acute in moderate exacerbation.   Treatment Plan: Discussed with patient diagnosis and treatment recommendations. Natural history and expected course discussed. Questions answered.  Educational material distributed.  Reduction in offending activity.  Gentle ROM exercises.  Plain film x-rays.  Shoulder injection. See procedure note.  Home physical therapy exercise handouts provided to patient.   formal PT script provided to patient. You may take this script to whichever physical therapist you would like to go to.   Over the counter NSAID and/or tylenol provided you do not have contraindications such as but not limited to liver or kidney disease or uncontrolled blood pressure. If you're doctors have told you to to not take them based on your health, do not take them.   Imaging: radiological studies ordered and independently reviewed; discussed with patient; pending radiologist interpretation.   Procedure: Discussed CSI/VSI as treatment options; discussed CSI vs VS injections as treatment options; since conservative measures did not improve symptoms patient consented for CSI today.  Therapy: Physical Therapy  RTC: 3 months.         Large Joint Aspiration/Injection: SA/SD injection: R subacromial bursa    Date/Time: 3/13/2023 1:00 PM  Performed by: Pebbles Mejía MD  Authorized by: Pebbles Mejía MD     Consent Done?:  Yes (Written)  Indications:  Diagnostic evaluation  Site marked: the procedure site was marked    Timeout: prior to procedure the  correct patient, procedure, and site was verified    Prep: patient was prepped and draped in usual sterile fashion      Local anesthesia used?: Yes    Local anesthetic:  Topical anesthetic    Details:  Needle Size:  21 G  Imaging guidance used: YES.  Approach:  Anterolateral  Location:  Shoulder  Site:  R subacromial bursa  Patient tolerance:  Patient tolerated the procedure well with no immediate complications     Staff: Bob Encarnacion MD     Risks:  Possible complications with the injection include bleeding, infection (.01%), tendon rupture, steroid flare, fat pad or soft tissue atrophy, skin depigmentation, allergic reaction to medications and vasovagal response. (steroid flare treatment is rest, ice, NSAIDs and resolves in 24-36 hours.)    Consent:  No absolute contraindications (cellulitis overlying joint, infection, lack of informed consent, allergy to injection medication, AVN protein or egg allergy for sodium hyaluronate, or history of steroid flare) or relative contraindications (uncontrolled DM2 A1c>10, coagulopathy, INR > 3.5, previous joint replacement or history of AVN).        Description:  The patient was prepped in normal sterile fashion use of chlorhexidine scrub and the appropriate and anatomic landmarks were identified with ultrasound.  Contents of syringe included: 5cc of 1% of lidocaine with 40mg of Kenalog     Post Procedure: Patient alert, and moving all extremities. ROM improved, pain decreased.  Good peripheral pulses, no signs of vascular compromise and range of motion intact.  Aftercare instructions were given to patient at time of discharge.  Relative rest for 3 days-avoiding excess activity.  Place ice on the area for 15 minutes every 4-6 hours. Patient may take Tylenol a 1000 mg b.i.d. or ibuprofen 600 mg t.i.d. for the next 3-4 days if not on medication already and safe to take pending co-morbidities.  Protect the area for the next 1-8 hours if anesthetic was used.  Avoid excessive  activity for the next 3-4 weeks.  ER precautions given for fever, severe joint pain or allergic reaction or other new symptoms related to the joint injection.       Pebbles Mejía MD  Sports Medicine Fellow

## 2023-04-10 ENCOUNTER — PATIENT MESSAGE (OUTPATIENT)
Dept: FAMILY MEDICINE | Facility: CLINIC | Age: 38
End: 2023-04-10
Payer: MEDICAID

## 2023-04-10 DIAGNOSIS — M54.9 DORSALGIA, UNSPECIFIED: Primary | ICD-10-CM

## 2023-04-14 ENCOUNTER — PATIENT MESSAGE (OUTPATIENT)
Dept: FAMILY MEDICINE | Facility: CLINIC | Age: 38
End: 2023-04-14
Payer: MEDICAID

## 2023-04-14 NOTE — TELEPHONE ENCOUNTER
Received patients physical therapy paperwork, they have started treatment on 3/21/23 with plans to go twice a week for 6 weeks. He completed PT on 4/17/23 and is still complaining of lower back pain. Will order MRI L spine

## 2024-02-05 ENCOUNTER — OFFICE VISIT (OUTPATIENT)
Dept: FAMILY MEDICINE | Facility: CLINIC | Age: 39
End: 2024-02-05
Payer: MEDICAID

## 2024-02-05 ENCOUNTER — HOSPITAL ENCOUNTER (OUTPATIENT)
Dept: RADIOLOGY | Facility: HOSPITAL | Age: 39
Discharge: HOME | End: 2024-02-05
Payer: MEDICAID

## 2024-02-05 VITALS
HEIGHT: 72 IN | WEIGHT: 179.38 LBS | DIASTOLIC BLOOD PRESSURE: 77 MMHG | OXYGEN SATURATION: 98 % | HEART RATE: 62 BPM | BODY MASS INDEX: 24.3 KG/M2 | SYSTOLIC BLOOD PRESSURE: 119 MMHG | RESPIRATION RATE: 20 BRPM | TEMPERATURE: 98 F

## 2024-02-05 DIAGNOSIS — M25.561 CHRONIC PAIN OF RIGHT KNEE: ICD-10-CM

## 2024-02-05 DIAGNOSIS — Z98.890 HISTORY OF REPAIR OF ACL: ICD-10-CM

## 2024-02-05 DIAGNOSIS — Z98.890 HISTORY OF REPAIR OF ACL: Primary | ICD-10-CM

## 2024-02-05 DIAGNOSIS — L70.9 ACNE, UNSPECIFIED ACNE TYPE: ICD-10-CM

## 2024-02-05 DIAGNOSIS — G89.29 CHRONIC PAIN OF RIGHT KNEE: ICD-10-CM

## 2024-02-05 DIAGNOSIS — G89.29 CHRONIC MIDLINE LOW BACK PAIN WITHOUT SCIATICA: Chronic | ICD-10-CM

## 2024-02-05 DIAGNOSIS — Z00.00 ENCOUNTER FOR WELLNESS EXAMINATION: ICD-10-CM

## 2024-02-05 DIAGNOSIS — M54.50 CHRONIC MIDLINE LOW BACK PAIN WITHOUT SCIATICA: Chronic | ICD-10-CM

## 2024-02-05 DIAGNOSIS — Z30.09 VASECTOMY EVALUATION: ICD-10-CM

## 2024-02-05 LAB
ALBUMIN SERPL-MCNC: 4.3 G/DL (ref 3.5–5)
ALBUMIN/GLOB SERPL: 1.1 RATIO (ref 1.1–2)
ALP SERPL-CCNC: 56 UNIT/L (ref 40–150)
ALT SERPL-CCNC: 23 UNIT/L (ref 0–55)
APPEARANCE UR: CLEAR
AST SERPL-CCNC: 23 UNIT/L (ref 5–34)
BACTERIA #/AREA URNS AUTO: ABNORMAL /HPF
BASOPHILS # BLD AUTO: 0.04 X10(3)/MCL
BASOPHILS NFR BLD AUTO: 0.8 %
BILIRUB SERPL-MCNC: 0.5 MG/DL
BILIRUB UR QL STRIP.AUTO: NEGATIVE
BUN SERPL-MCNC: 20.1 MG/DL (ref 8.9–20.6)
CALCIUM SERPL-MCNC: 9.6 MG/DL (ref 8.4–10.2)
CHLORIDE SERPL-SCNC: 103 MMOL/L (ref 98–107)
CHOLEST SERPL-MCNC: 209 MG/DL
CHOLEST/HDLC SERPL: 3 {RATIO} (ref 0–5)
CO2 SERPL-SCNC: 30 MMOL/L (ref 22–29)
COLOR UR AUTO: COLORLESS
CREAT SERPL-MCNC: 0.93 MG/DL (ref 0.73–1.18)
DEPRECATED CALCIDIOL+CALCIFEROL SERPL-MC: 29.9 NG/ML (ref 30–80)
EOSINOPHIL # BLD AUTO: 0.27 X10(3)/MCL (ref 0–0.9)
EOSINOPHIL NFR BLD AUTO: 5.6 %
ERYTHROCYTE [DISTWIDTH] IN BLOOD BY AUTOMATED COUNT: 13.7 % (ref 11.5–17)
EST. AVERAGE GLUCOSE BLD GHB EST-MCNC: 93.9 MG/DL
GFR SERPLBLD CREATININE-BSD FMLA CKD-EPI: >60 MLS/MIN/1.73/M2
GLOBULIN SER-MCNC: 3.8 GM/DL (ref 2.4–3.5)
GLUCOSE SERPL-MCNC: 99 MG/DL (ref 74–100)
GLUCOSE UR QL STRIP.AUTO: NORMAL
HBA1C MFR BLD: 4.9 %
HCT VFR BLD AUTO: 47.5 % (ref 42–52)
HDLC SERPL-MCNC: 62 MG/DL (ref 35–60)
HGB BLD-MCNC: 15.8 G/DL (ref 14–18)
HYALINE CASTS #/AREA URNS LPF: ABNORMAL /LPF
IMM GRANULOCYTES # BLD AUTO: 0 X10(3)/MCL (ref 0–0.04)
IMM GRANULOCYTES NFR BLD AUTO: 0 %
KETONES UR QL STRIP.AUTO: NEGATIVE
LDLC SERPL CALC-MCNC: 132 MG/DL (ref 50–140)
LEUKOCYTE ESTERASE UR QL STRIP.AUTO: NEGATIVE
LYMPHOCYTES # BLD AUTO: 2.15 X10(3)/MCL (ref 0.6–4.6)
LYMPHOCYTES NFR BLD AUTO: 45 %
MCH RBC QN AUTO: 26 PG (ref 27–31)
MCHC RBC AUTO-ENTMCNC: 33.3 G/DL (ref 33–36)
MCV RBC AUTO: 78.1 FL (ref 80–94)
MONOCYTES # BLD AUTO: 0.27 X10(3)/MCL (ref 0.1–1.3)
MONOCYTES NFR BLD AUTO: 5.6 %
NEUTROPHILS # BLD AUTO: 2.05 X10(3)/MCL (ref 2.1–9.2)
NEUTROPHILS NFR BLD AUTO: 43 %
NITRITE UR QL STRIP.AUTO: NEGATIVE
NRBC BLD AUTO-RTO: 0 %
PH UR STRIP.AUTO: 6.5 [PH]
PLATELET # BLD AUTO: 287 X10(3)/MCL (ref 130–400)
PMV BLD AUTO: 9.5 FL (ref 7.4–10.4)
POTASSIUM SERPL-SCNC: 4 MMOL/L (ref 3.5–5.1)
PROT SERPL-MCNC: 8.1 GM/DL (ref 6.4–8.3)
PROT UR QL STRIP.AUTO: NEGATIVE
RBC # BLD AUTO: 6.08 X10(6)/MCL (ref 4.7–6.1)
RBC #/AREA URNS AUTO: ABNORMAL /HPF
RBC UR QL AUTO: NEGATIVE
SODIUM SERPL-SCNC: 140 MMOL/L (ref 136–145)
SP GR UR STRIP.AUTO: 1.01 (ref 1–1.03)
SQUAMOUS #/AREA URNS LPF: ABNORMAL /HPF
T4 FREE SERPL-MCNC: 1.1 NG/DL (ref 0.7–1.48)
TRIGL SERPL-MCNC: 75 MG/DL (ref 34–140)
TSH SERPL-ACNC: 1.06 UIU/ML (ref 0.35–4.94)
UROBILINOGEN UR STRIP-ACNC: NORMAL
VLDLC SERPL CALC-MCNC: 15 MG/DL
WBC # SPEC AUTO: 4.78 X10(3)/MCL (ref 4.5–11.5)
WBC #/AREA URNS AUTO: ABNORMAL /HPF

## 2024-02-05 PROCEDURE — 99396 PREV VISIT EST AGE 40-64: CPT | Mod: S$PBB,,,

## 2024-02-05 PROCEDURE — 81001 URINALYSIS AUTO W/SCOPE: CPT

## 2024-02-05 PROCEDURE — 82306 VITAMIN D 25 HYDROXY: CPT

## 2024-02-05 PROCEDURE — 84443 ASSAY THYROID STIM HORMONE: CPT

## 2024-02-05 PROCEDURE — 80053 COMPREHEN METABOLIC PANEL: CPT

## 2024-02-05 PROCEDURE — 3078F DIAST BP <80 MM HG: CPT | Mod: CPTII,,,

## 2024-02-05 PROCEDURE — 36415 COLL VENOUS BLD VENIPUNCTURE: CPT

## 2024-02-05 PROCEDURE — 84439 ASSAY OF FREE THYROXINE: CPT

## 2024-02-05 PROCEDURE — 99215 OFFICE O/P EST HI 40 MIN: CPT | Mod: PBBFAC,PN

## 2024-02-05 PROCEDURE — 85025 COMPLETE CBC W/AUTO DIFF WBC: CPT

## 2024-02-05 PROCEDURE — 1160F RVW MEDS BY RX/DR IN RCRD: CPT | Mod: CPTII,,,

## 2024-02-05 PROCEDURE — 80061 LIPID PANEL: CPT

## 2024-02-05 PROCEDURE — 3008F BODY MASS INDEX DOCD: CPT | Mod: CPTII,,,

## 2024-02-05 PROCEDURE — 83036 HEMOGLOBIN GLYCOSYLATED A1C: CPT

## 2024-02-05 PROCEDURE — 73562 X-RAY EXAM OF KNEE 3: CPT | Mod: TC,PN,RT

## 2024-02-05 PROCEDURE — 3074F SYST BP LT 130 MM HG: CPT | Mod: CPTII,,,

## 2024-02-05 PROCEDURE — 1159F MED LIST DOCD IN RCRD: CPT | Mod: CPTII,,,

## 2024-02-05 RX ORDER — TRETINOIN 1 MG/G
CREAM TOPICAL NIGHTLY
Qty: 45 G | Refills: 2 | Status: SHIPPED | OUTPATIENT
Start: 2024-02-05

## 2024-02-05 NOTE — ASSESSMENT & PLAN NOTE
Take pain medications as prescribed.  Stay active. Having strong muscles takes the strain off of your joints, which can help reduce your pain.  Rest for several minutes when your pain is at its worst.  Goal BMI <30.   Alternate hot and cold packs as needed for pain relief.      Referral to Dr. Wagner per his request (unsure if they take his insurance or not) and to Ortho at [facility] which will take his insurance.

## 2024-02-05 NOTE — PROGRESS NOTES
Patient Name: Nick Valente     : 1985    MRN: [id number]     Subjective:     Patient ID: Nick Valente is a 38 y.o. male.    Chief Complaint:   Chief Complaint   Patient presents with    Follow-up     Patient requesting appt. Requesting referral to orthopedic surgery. Interested in having a vasectomy and if his insurance will pay. Interested in having LASIK eye surgery and if his insurance will pay.         HPI: 2024:  Patient presents to clinic today for routine wellness exam, he is also complaining of need for 3 referrals.  Requesting referral to have LASIK eye surgery, does not currently follow with any eye doctor who will be performing this but would like to know if his insurance will cover it.  He is also requesting a referral for a vasectomy.  States that he has an appointment with Dr. Orona and rojas mclean this month, is not sure if vasectomy is a procedure that will be covered by his insurance.  We will place referral to Urology at [facility] as they do accept his insurance.  Additionally he is complaining of worsening right knee pain, did have ACL surgery many years ago but states he has been more active with surfing and has had difficult time recovering from pain.  Would like referral to be placed to previous orthopedic surgeon who performed surgery in [place], we will attempt to get patient back with this provider pull place referral to orthopedic clinic in [place] the does accept his insurance also. Did previously complete PT for his knee about a year ago. Denies any popping, locking and foreign body sensation. Patient denies chest pain, palpitations, and shortness of breath.  Patient denies fever, night sweats, chills, nausea, vomiting, diarrhea, constipation, weight loss, and changes in appetite.      2023:  Patient presents today to review labs, discussed result with patient and all questions answered to patient's understanding.  Patient starts physical therapy today for shoulder and low  back pain, we will be adding referral for right knee pain.  Patient has history of ACL repair when he was 16 years old, states that his knee feels strong but he gets nervous when he runs because he feels lots of pain and knee after.  Denies any twisting, turning, pulling or sharp shooting pains and knee.  Patient states since utilizing allergy medications that he notices improvement in congestion and muffled hearing in ear, does endorse tinnitus of left ear.  He is amenable to audiology referral today.    01/18/2023:  Patient presents to establish care today, no recent previous primary care provider.  Patient's last provider was in [place] where he last lived.  Patient was being treated for right shoulder pain while living in [place], states that he had received steroid injections in his right shoulder joint, states that it did provide some relief but the next plan of care was surgical options.  Patient moved and never followed up on this issue.  Patient lives active lifestyle, enjoys surfing.  Notices pain and compensation associated with this injury.  Has never performed physical therapy previously but is open to referral.  As well as right shoulder pain he also complains of neck pain and back pain.  Patient states that at 1 point in time he did report to emergency department for back pain as it had gotten so severe his legs gave out.  No imaging was done at this time.  Patient has not had episode where legs give out but does endorse seeing back pain.  Patient states that at worst his pain is an 8 but is consistently about a 3/10.  Patient utilizes over-the-counter medications currently to help with pain states that it helps provide some relief.  Patient further states that he feels congestion in his right ear, it is most noticeable when he puts on his headphones to play video games as noises are muffled and it feels weird.  Denies any tinnitus or drainage from ear.  Patient also states that he needs albuterol  inhaler as his last 1 has  and he likes having 1 just in case.  Patient has not had any complications or had to use albuterol inhaler in 2 years but did have asthma in childhood.  Patient requesting refill of tretinoin when cream.  He has been utilizing this medicine to help with acne.        ROS:        12 point review of systems conducted, negative except as stated in the history of present illness. See HPI for details.    History:     History reviewed. No pertinent past medical history.     History reviewed. No pertinent surgical history.    History reviewed. No pertinent family history.     Social History     Tobacco Use    Smoking status: Never    Smokeless tobacco: Never   Substance and Sexual Activity    Alcohol use: Not Currently    Drug use: Never    Sexual activity: Not Currently     Partners: Female       Current Outpatient Medications   Medication Instructions    albuterol (VENTOLIN HFA) 90 mcg/actuation inhaler 2 puffs, Inhalation, Every 6 hours PRN, Rescue    fluticasone propionate (FLONASE) 50 mcg, Each Nostril, Daily    loratadine (CLARITIN) 10 mg, Oral, Daily    tretinoin (RETIN-A) 0.1 % cream Topical (Top), Nightly        Review of patient's allergies indicates:  No Known Allergies    Objective:     Visit Vitals  /77 (BP Location: Right arm, Patient Position: Sitting)   Pulse 62   Temp 97.8 °F (36.6 °C) (Oral)   Resp 20   Ht 6' (1.829 m)   Wt 81.4 kg (179 lb 6.4 oz)   SpO2 98%   BMI 24.33 kg/m²       Physical Examination:     Physical Exam  Constitutional:       General: He is not in acute distress.     Appearance: Normal appearance. He is not ill-appearing.   Cardiovascular:      Rate and Rhythm: Normal rate and regular rhythm.      Heart sounds: Normal heart sounds.   Pulmonary:      Effort: Pulmonary effort is normal. No respiratory distress.      Breath sounds: Normal breath sounds.   Musculoskeletal:      Cervical back: Normal range of motion.      Right knee: No swelling,  deformity, effusion or erythema. Normal range of motion.      Left knee: Normal.   Skin:     General: Skin is warm and dry.   Neurological:      Mental Status: He is alert and oriented to person, place, and time.   Psychiatric:         Mood and Affect: Mood normal.         Behavior: Behavior normal.         Lab Results:     Chemistry:  Lab Results   Component Value Date     01/18/2023    K 3.9 01/18/2023    CHLORIDE 104 01/18/2023    BUN 9.9 01/18/2023    CREATININE 0.95 01/18/2023    EGFRNORACEVR >60 01/18/2023    GLUCOSE 91 01/18/2023    CALCIUM 10.1 01/18/2023    ALKPHOS 58 01/18/2023    LABPROT 8.3 01/18/2023    ALBUMIN 4.6 01/18/2023    AST 23 01/18/2023    ALT 28 01/18/2023    TSH 1.122 01/18/2023    TNYXGU1YYCO 1.11 01/18/2023        Lab Results   Component Value Date    HGBA1C 5.3 01/18/2023        Hematology:  Lab Results   Component Value Date    WBC 5.0 01/18/2023    HGB 14.9 01/18/2023    HCT 45.2 01/18/2023     01/18/2023       Lipid Panel:  Lab Results   Component Value Date    CHOL 219 (H) 01/18/2023    HDL 48 01/18/2023    .00 01/18/2023    TRIG 164 (H) 01/18/2023    TOTALCHOLEST 5 01/18/2023        Urine:  Lab Results   Component Value Date    COLORUA Light-Yellow 01/18/2023    APPEARANCEUA Clear 01/18/2023    SGUA 1.011 01/18/2023    PHUA 6.5 01/18/2023    PROTEINUA Negative 01/18/2023    GLUCOSEUA Normal 01/18/2023    KETONESUA Negative 01/18/2023    BLOODUA Negative 01/18/2023    NITRITESUA Negative 01/18/2023    LEUKOCYTESUR Negative 01/18/2023    RBCUA 0-5 01/18/2023    WBCUA None Seen 01/18/2023    BACTERIA None Seen 01/18/2023    SQEPUA None Seen 01/18/2023    HYALINECASTS None Seen 01/18/2023        Assessment:          ICD-10-CM ICD-9-CM   1. History of repair of ACL  Z98.890 V45.89   2. Acne, unspecified acne type  L70.9 706.1   3. Chronic pain of right knee  M25.561 719.46    G89.29 338.29   4. Vasectomy evaluation  Z30.09 V25.09   5. Encounter for wellness examination   Z00.00 V70.0   6. Chronic midline low back pain without sciatica  M54.50 724.2    G89.29 338.29        Plan:     1. History of repair of ACL  Comments:  Patient requested referral to previous provider, orthopedic surgeon Dr. Wagner fixed ACL  Orders:  -     X-Ray Knee 3 View Right; Future; Expected date: 02/05/2024  -     Ambulatory referral/consult to Orthopedics; Future; Expected date: 02/05/2024  -     Ambulatory referral/consult to Orthopedics; Future; Expected date: 02/05/2024    2. Acne, unspecified acne type  -     tretinoin (RETIN-A) 0.1 % cream; Apply topically every evening.  Dispense: 45 g; Refill: 2    3. Chronic pain of right knee  Assessment & Plan:  Take pain medications as prescribed.  Stay active. Having strong muscles takes the strain off of your joints, which can help reduce your pain.  Rest for several minutes when your pain is at its worst.  Goal BMI <30.   Alternate hot and cold packs as needed for pain relief.      Referral to Dr. Wagner per his request (unsure if they take his insurance or not) and to Ortho at [facility] which will take his insurance.       Orders:  -     X-Ray Knee 3 View Right; Future; Expected date: 02/05/2024  -     Ambulatory referral/consult to Orthopedics; Future; Expected date: 02/05/2024  -     Ambulatory referral/consult to Orthopedics; Future; Expected date: 02/05/2024    4. Vasectomy evaluation  -     Ambulatory referral/consult to Urology; Future; Expected date: 02/05/2024    5. Encounter for wellness examination  -     TSH  -     T4, Free  -     Hemoglobin A1C  -     Lipid Panel  -     CBC Auto Differential  -     Comprehensive Metabolic Panel  -     Vitamin D  -     Urinalysis, Reflex to Urine Culture    6. Chronic midline low back pain without sciatica  Assessment & Plan:  Patient has MRI scheduled in the middle of March for further assessment of chronic back pain.         Follow-up for annual visit in 1 year or sooner if needed.    Future Appointments   Date Time  Provider Department Center   3/15/2024 12:00 PM [facility] MRI1 [address] [facility] MRI [facility]   2/5/2025  1:00 PM [facility] Mg NP [facility]        Alecia Mg NP

## 2024-02-06 ENCOUNTER — PATIENT MESSAGE (OUTPATIENT)
Dept: FAMILY MEDICINE | Facility: CLINIC | Age: 39
End: 2024-02-06
Payer: MEDICAID

## 2024-02-06 DIAGNOSIS — G89.29 CHRONIC PAIN OF RIGHT KNEE: ICD-10-CM

## 2024-02-06 DIAGNOSIS — M25.561 CHRONIC PAIN OF RIGHT KNEE: ICD-10-CM

## 2024-02-06 DIAGNOSIS — Z98.890 HISTORY OF REPAIR OF ACL: Primary | ICD-10-CM

## 2024-02-26 ENCOUNTER — HOSPITAL ENCOUNTER (OUTPATIENT)
Dept: RADIOLOGY | Facility: HOSPITAL | Age: 39
Discharge: HOME | End: 2024-02-26
Attending: STUDENT IN AN ORGANIZED HEALTH CARE EDUCATION/TRAINING PROGRAM
Payer: MEDICAID

## 2024-02-26 ENCOUNTER — HOSPITAL ENCOUNTER (OUTPATIENT)
Dept: RADIOLOGY | Facility: HOSPITAL | Age: 39
Discharge: HOME | End: 2024-02-26
Attending: FAMILY MEDICINE
Payer: MEDICAID

## 2024-02-26 ENCOUNTER — OFFICE VISIT (OUTPATIENT)
Dept: ORTHOPEDICS | Facility: CLINIC | Age: 39
End: 2024-02-26
Payer: MEDICAID

## 2024-02-26 VITALS
DIASTOLIC BLOOD PRESSURE: 77 MMHG | HEIGHT: 72 IN | HEART RATE: 72 BPM | WEIGHT: 178.19 LBS | SYSTOLIC BLOOD PRESSURE: 133 MMHG | BODY MASS INDEX: 24.14 KG/M2 | TEMPERATURE: 98 F

## 2024-02-26 DIAGNOSIS — M23.92 INTERNAL DERANGEMENT OF BOTH KNEES: Primary | ICD-10-CM

## 2024-02-26 DIAGNOSIS — M23.92 INTERNAL DERANGEMENT OF LEFT KNEE: ICD-10-CM

## 2024-02-26 DIAGNOSIS — M23.91 INTERNAL DERANGEMENT OF BOTH KNEES: Primary | ICD-10-CM

## 2024-02-26 DIAGNOSIS — M25.561 RIGHT KNEE PAIN, UNSPECIFIED CHRONICITY: ICD-10-CM

## 2024-02-26 PROCEDURE — 99215 OFFICE O/P EST HI 40 MIN: CPT | Mod: PBBFAC,25

## 2024-02-26 PROCEDURE — 20610 DRAIN/INJ JOINT/BURSA W/O US: CPT | Mod: 50,PBBFAC | Performed by: STUDENT IN AN ORGANIZED HEALTH CARE EDUCATION/TRAINING PROGRAM

## 2024-02-26 PROCEDURE — 73564 X-RAY EXAM KNEE 4 OR MORE: CPT | Mod: TC,RT

## 2024-02-26 PROCEDURE — 73564 X-RAY EXAM KNEE 4 OR MORE: CPT | Mod: TC,LT

## 2024-02-26 RX ORDER — TRIAMCINOLONE ACETONIDE 40 MG/ML
40 INJECTION, SUSPENSION INTRA-ARTICULAR; INTRAMUSCULAR
Status: COMPLETED | OUTPATIENT
Start: 2024-02-26 | End: 2024-02-26

## 2024-02-26 RX ORDER — LIDOCAINE HYDROCHLORIDE 10 MG/ML
5 INJECTION, SOLUTION EPIDURAL; INFILTRATION; INTRACAUDAL; PERINEURAL
Status: COMPLETED | OUTPATIENT
Start: 2024-02-26 | End: 2024-02-26

## 2024-02-26 RX ORDER — TRIAMCINOLONE ACETONIDE 40 MG/ML
40 INJECTION, SUSPENSION INTRA-ARTICULAR; INTRAMUSCULAR ONCE
Status: COMPLETED | OUTPATIENT
Start: 2024-02-26 | End: 2024-02-26

## 2024-02-26 RX ADMIN — LIDOCAINE HYDROCHLORIDE 50 MG: 10 INJECTION, SOLUTION EPIDURAL; INFILTRATION; INTRACAUDAL; PERINEURAL at 12:02

## 2024-02-26 RX ADMIN — TRIAMCINOLONE ACETONIDE 40 MG: 40 INJECTION, SUSPENSION INTRA-ARTICULAR; INTRAMUSCULAR at 12:02

## 2024-02-26 NOTE — PROGRESS NOTES
"Subjective:    Patient ID: Nick Valente is a 38 y.o. male  who presented to [facility] for follow up.      Chief Complaint: Pain of the Right Knee      History of Present Illness:  Nick Valente history of right ACL repair in 2001 presents to the clinic complaining of chronic worsening bilateral knee pain (R>L) onset about 7 months ago. He states that while playing a softball game 7 months ago, his knees had internally rotated as he was running around the bases after hitting a home-run, and then he experienced immediate bilateral knee pain underneath the kneecap,  9/10, with immediate swelling around his knees.  He  was able to walk with a limp immedaitely after the injury. Currently he has bilateral knee pain, 2/10 bilaterally that is located "inside his knee joint", can exacerbate to a 7-8/10 with  with walking, running, standing up from a sitting position, and prolonged standing, and improves with rest and advil/tylenol. Admits to associated to clicking of his bilateral knees and catching of his right knee. Treatment to date: ice and advil/tylenol. Imaging to date: radiograph. He admits to no other injury to his bilateral knees. He admits that the swelling has improved, but the pain has not changed. Occupation: real estate. He denies his pain is affecting his ADL.     Knee Review of Systems:  Swelling?  no  Instability?  no  Mechanical sx?  yes  <30 min AM stiffness? no  Limited ROM? no  Fever/Chills? no       Objective:      Physical Exam:    /77   Pulse 72   Temp 97.6 °F (36.4 °C)   Ht 6' (1.829 m)   Wt 80.8 kg (178 lb 3.2 oz)   BMI 24.17 kg/m²     Ortho/SPM Exam    Appearance:  Normal gait/station  FWB  Alignment: Left: normal Right: normal   Soft tissue swelling: Left: no Right: no  Effusion: Left:  Negative Right: Negative  Erythema: Left no Right: no  Ecchymosis: Left: no Right: no  Atrophy: Left: no Right: no    Palpation:  Knee Tenderness: Left: " Lateral joint line Right: Medial joint line    Range of motion:  Flexion (140): Left:  140 Right: 140  Extension (0): Left: 0 Right: 0    Strength:  Extension: Left 5/5  Pain: no     Right 5/5 Pain: no  Flexion: Left 5/5 Pain: no Right   5/5 Pain: no        Special Tests:  Ballotable Effusion:Left: Negative Right: Negative   Fluid Wave: Left: Negative Right: Positive   Crepitus: Left: Negative Right: Positive   Patellar grind test: Left: Negative  Right: Positive  Apprehension test: Left: Negative Right: Negative   Varus: @ 0, Left Negative Right: Negative.  @ 30, Left Negative  Right Negative   Valgus: @ 0, Left Negative Right: Negative.  @ 30, Left Negative  Right Negative  Lachman: Left: Negative Right: Negative   Ant Drawer: Left: Negative Right: Negative   Posterior Drawer: Left: Negative Right: Negative   Dial Test: Left: Not performed Right: Not performed   Janes: Left: Positive Right: Positive   Apley's: Left: Not performed Right: Not performed  Thessaly's: Left: Not performed Right: Positive   Noble Compression: Left: Not performed Right: Not performed   Derian: Left: Not performed Right: Not performed       General appearance: NAD  Peripheral pulses: normal bilaterally   Reflexes: Left: normal Right normal   Sensation: normal    Labs:  Last A1c: The patient doesn't have any registry metric data available     Imaging:   Previous images reviewed.  X-rays ordered and performed today: yes  # of views: 4 Laterality: bilateral  My Interpretation of the right knee x-ray: osteophytes at the superior facet of the patella and in the tibial plateau.  Postsurgical changes of ligamentous repair hardware appears to be intact.  No acute fractures or dislocations noted.    Limited right knee ultrasound reveals a tear of the medial and lateral meniscus.  Intact quadriceps and patellar tendons.  Intact MCL and LCL.    Limited left knee ultrasound reveals a questionable tear of the medial and lateral meniscus.  Intact  quadriceps patellar tendons and an intact MCL and LCL.    Assessment:        Encounter Diagnoses   Code Name Primary?    M23.91, M23.92 Internal derangement of both knees Yes        Plan:       MDM: Prior external referring provider notes reviewed. Prior external referring provider studies reviewed.   Dx:  Bilateral knee internal derangement, chronic with exacerbation  Treatment Plan: Discussed with patient diagnosis, prognosis, and treatment recommendations. Education provided.    Discussed conservative management, including activity modification, avoiding aggravating activities, hot and cold therapies, oral analgesics, home exercise program, formal physical therapy, and bilateral knee CSI.  We will follow up in 3 months and consider obtaining advanced imaging at that time if symptoms persist.  Imaging: radiological studies ordered and independently reviewed; discussed with patient; pending radiologist interpretation.   Weight Management: is paramount. Maintain healthy weight of a BMI of <24.9..   Procedure:  discussed CSI vs VS injections as treatment options; since conservative measures did not improve symptoms patient consented for CSI today.  Postprocedure pain: 0/10  Activity: Activity as tolerated; HEP to include aerobic conditioning and strength training with non-painful activity. ROM/STG exercises. Proper footware; assistive devises to avoid limping.   Therapy: Physical Therapy  Medication: CONTINUE over-the-counter NSAIDs (ibuprofen 200mg three tablets three times a day as needed). Please see your primary care physician for further refills.  RTC: 3 months.         Large Joint Aspiration/Injection: bilateral supra patellar bursa    Date/Time: 2/26/2024 9:50 AM    Performed by: Luis Dwyer MD  Authorized by: Luis Dwyer MD    Consent Done?:  Yes (Written)  Indications:  Pain and diagnostic evaluation (Internal derangement)  Site marked: the procedure site was marked    Timeout: prior to procedure the  correct patient, procedure, and site was verified    Prep: patient was prepped and draped in usual sterile fashion      Local anesthesia used?: Yes    Local anesthetic:  Topical anesthetic    Details:  Needle Size:  21 G  Approach:  Superior  Location:  Knee  Laterality:  Bilateral  Site:  Bilateral supra patellar bursa  Patient tolerance:  Patient tolerated the procedure well with no immediate complications     Staff: Frances Cantu MD     Risks:  Possible complications with the injection include bleeding, infection (.01%), tendon rupture, steroid flare, fat pad or soft tissue atrophy, skin depigmentation, allergic reaction to medications and vasovagal response. (steroid flare treatment is rest, ice, NSAIDs and resolves in 24-36 hours.)    Consent:  No absolute contraindications (cellulitis overlying joint, infection, lack of informed consent, allergy to injection medication, AVN protein or egg allergy for sodium hyaluronate, or history of steroid flare) or relative contraindications (uncontrolled DM2 A1c>10, coagulopathy, INR > 3.5, previous joint replacement or history of AVN).        Description:  The patient was prepped in normal sterile fashion use of chlorhexidine scrub and the appropriate and anatomic landmarks were identified with ultrasound.  Contents of syringe included: 5cc of 1% of lidocaine with 40mg of Kenalog     Post Procedure: Patient alert, and moving all extremities. ROM improved, pain decreased.  Good peripheral pulses, no signs of vascular compromise and range of motion intact.  Aftercare instructions were given to patient at time of discharge.  Relative rest for 3 days-avoiding excess activity.  Place ice on the area for 15 minutes every 4-6 hours. Patient may take Tylenol a 1000 mg b.i.d. or ibuprofen 600 mg t.i.d. for the next 3-4 days if not on medication already and safe to take pending co-morbidities.  Protect the area for the next 1-8 hours if anesthetic was used.  Avoid excessive  activity for the next 3-4 weeks.  ER precautions given for fever, severe joint pain or allergic reaction or other new symptoms related to the joint injection.           This note is dictated using the [place] Fluency Direct word recognition program. There are word recognition mistakes that are occasionally missed on review.    Luis Dwyer D.O.  Sports Medicine Fellow

## 2024-03-04 NOTE — PROGRESS NOTES
Faculty Attestation: Nick Valente  was seen in [facility] Medicine Clinic. Discussed with Dr. Dwyer at the time of the visit. History of Present Illness, Physical Exam, and Assessment and Plan reviewed. Treatment plan is reasonable and appropriate. Compliance with treatment recommendations is important.  Radiology images independently reviewed and agree with radiologist interpretation. Procedure note reviewed. Patient tolerated procedure well.      Frances Cantu MD  Sports Medicine

## 2024-03-15 ENCOUNTER — HOSPITAL ENCOUNTER (OUTPATIENT)
Dept: RADIOLOGY | Facility: HOSPITAL | Age: 39
Discharge: HOME | End: 2024-03-15
Payer: MEDICAID

## 2024-03-15 DIAGNOSIS — M54.9 DORSALGIA, UNSPECIFIED: ICD-10-CM

## 2024-03-15 PROCEDURE — 72158 MRI LUMBAR SPINE W/O & W/DYE: CPT | Mod: TC

## 2024-03-15 PROCEDURE — A9577 INJ MULTIHANCE: HCPCS

## 2024-03-15 PROCEDURE — 25500020 PHARM REV CODE 255

## 2024-03-15 RX ADMIN — GADOBENATE DIMEGLUMINE 15 ML: 529 INJECTION, SOLUTION INTRAVENOUS at 12:03

## 2024-03-17 ENCOUNTER — HOSPITAL ENCOUNTER (EMERGENCY)
Facility: HOSPITAL | Age: 39
Discharge: HOME | End: 2024-03-17
Attending: EMERGENCY MEDICINE
Payer: MEDICAID

## 2024-03-17 VITALS
TEMPERATURE: 98 F | BODY MASS INDEX: 23.57 KG/M2 | HEART RATE: 74 BPM | DIASTOLIC BLOOD PRESSURE: 73 MMHG | RESPIRATION RATE: 20 BRPM | SYSTOLIC BLOOD PRESSURE: 117 MMHG | WEIGHT: 174 LBS | HEIGHT: 72 IN | OXYGEN SATURATION: 96 %

## 2024-03-17 DIAGNOSIS — M54.50 ACUTE MIDLINE LOW BACK PAIN WITHOUT SCIATICA: Primary | ICD-10-CM

## 2024-03-17 PROCEDURE — 63600175 PHARM REV CODE 636 W HCPCS: Performed by: EMERGENCY MEDICINE

## 2024-03-17 PROCEDURE — 96372 THER/PROPH/DIAG INJ SC/IM: CPT | Performed by: EMERGENCY MEDICINE

## 2024-03-17 PROCEDURE — 99284 EMERGENCY DEPT VISIT MOD MDM: CPT | Mod: 25

## 2024-03-17 PROCEDURE — 25000003 PHARM REV CODE 250: Performed by: EMERGENCY MEDICINE

## 2024-03-17 RX ORDER — HYDROCODONE BITARTRATE AND ACETAMINOPHEN 7.5; 325 MG/1; MG/1
1 TABLET ORAL ONCE
Status: COMPLETED | OUTPATIENT
Start: 2024-03-17 | End: 2024-03-17

## 2024-03-17 RX ORDER — HYDROCODONE BITARTRATE AND ACETAMINOPHEN 7.5; 325 MG/1; MG/1
1 TABLET ORAL EVERY 6 HOURS PRN
Qty: 12 TABLET | Refills: 0 | Status: SHIPPED | OUTPATIENT
Start: 2024-03-17

## 2024-03-17 RX ORDER — KETOROLAC TROMETHAMINE 30 MG/ML
30 INJECTION, SOLUTION INTRAMUSCULAR; INTRAVENOUS
Status: COMPLETED | OUTPATIENT
Start: 2024-03-17 | End: 2024-03-17

## 2024-03-17 RX ORDER — IBUPROFEN 800 MG/1
800 TABLET ORAL EVERY 6 HOURS PRN
Qty: 20 TABLET | Refills: 0 | Status: SHIPPED | OUTPATIENT
Start: 2024-03-17

## 2024-03-17 RX ORDER — TIZANIDINE 4 MG/1
4 TABLET ORAL EVERY 6 HOURS PRN
Qty: 20 TABLET | Refills: 0 | Status: SHIPPED | OUTPATIENT
Start: 2024-03-17 | End: 2024-03-27

## 2024-03-17 RX ADMIN — HYDROCODONE BITARTRATE AND ACETAMINOPHEN 1 TABLET: 7.5; 325 TABLET ORAL at 11:03

## 2024-03-17 RX ADMIN — KETOROLAC TROMETHAMINE 30 MG: 30 INJECTION, SOLUTION INTRAMUSCULAR at 11:03

## 2024-03-17 NOTE — Clinical Note
"Nick Zuleta" Ambrosio was seen and treated in our emergency department on 3/17/2024.  He may return to work on 03/19/2024.       If you have any questions or concerns, please don't hesitate to call.      Elsa Palmer MD"
2

## 2024-03-18 NOTE — ED PROVIDER NOTES
"Encounter Date: 3/17/2024       History     Chief Complaint   Patient presents with    Back Pain     " I was sitting at table took a deep breath and had pain in my spine." Some thing happened few years ago."     38-year-old male with a history of lumbar disc disease states that he took a deep breath when he was seated and started having a severe sharp pain in the upper L-spine region.  The pain does not radiate and is continuous and moderate.  He had an MRI Lspine on Friday, 3 days ago but does not yet have the results.  No abdominal pain, ambulatory without difficulty, and he took ibuprofen which did not help.        Review of patient's allergies indicates:  No Known Allergies  No past medical history on file.  Past Surgical History:   Procedure Laterality Date    Right ACL repair       Family History   Problem Relation Age of Onset    Arthritis Mother     Hyperlipidemia Mother     Hypertension Mother     Diabetes Father     Heart disease Father     Hypertension Father     Stroke Father      Social History     Tobacco Use    Smoking status: Never    Smokeless tobacco: Never   Substance Use Topics    Alcohol use: Never    Drug use: Never     Review of Systems   Musculoskeletal:  Positive for back pain.   All other systems reviewed and are negative.      Physical Exam     Initial Vitals [03/17/24 2307]   BP Pulse Resp Temp SpO2   117/73 74 18 98.3 °F (36.8 °C) 96 %      MAP       --         Physical Exam    Nursing note and vitals reviewed.  Constitutional: He appears well-developed and well-nourished.   Pulmonary/Chest: No respiratory distress.   Musculoskeletal:      Comments: Tender directly over the upper L-spine L1-2 region, and to the paraspinous muscles in this region.  No palpable abnormality.  No CVA tenderness.  Patient is ambulatory without difficulty.  Normal motor and sensation in the lower extremities     Neurological: He is alert and oriented to person, place, and time. GCS score is 15. GCS eye " subscore is 4. GCS verbal subscore is 5. GCS motor subscore is 6.   Skin: Skin is warm and dry. Capillary refill takes less than 2 seconds.         ED Course   Procedures  Labs Reviewed - No data to display       Imaging Results    None          Medications   ketorolac injection 30 mg (30 mg Intramuscular Given 3/17/24 2333)   HYDROcodone-acetaminophen 7.5-325 mg per tablet 1 tablet (1 tablet Oral Given 3/17/24 2333)     Medical Decision Making  See HPI for narrative    Differential diagnosis includes but is not limited to lumbar disc disease, radiated pain from kidney infection, kidney stone, or other intra-abdominal source    Amount and/or Complexity of Data Reviewed  Discussion of management or test interpretation with external provider(s): Patient was seen and evaluated in the Emergency Department with history, physical exam, and chart review.  He was given a dose of Norco and Toradol for pain which helped.  Discussed doing CT scan or other workup but he states he would rather wait on his MRI result.  He will follow-up with his PCP for further care.  ER return precautions were discussed.    Risk  Prescription drug management.                                      Clinical Impression:  Final diagnoses:  [M54.50] Acute midline low back pain without sciatica (Primary)          ED Disposition Condition    Discharge Stable          ED Prescriptions       Medication Sig Dispense Start Date End Date Auth. Provider    HYDROcodone-acetaminophen (NORCO) 7.5-325 mg per tablet Take 1 tablet by mouth every 6 (six) hours as needed for Pain. 12 tablet 3/17/2024 -- Elsa Palmer MD    tiZANidine (ZANAFLEX) 4 MG tablet Take 1 tablet (4 mg total) by mouth every 6 (six) hours as needed (muscle spapsm). 20 tablet 3/17/2024 3/27/2024 Elsa Palmer MD    ibuprofen (ADVIL,MOTRIN) 800 MG tablet Take 1 tablet (800 mg total) by mouth every 6 (six) hours as needed for Pain. 20 tablet 3/17/2024 -- Elsa Palmer MD           Follow-up Information       Follow up With Specialties Details Why Contact Info    Alecia Mg NP Family Medicine Schedule an appointment as soon as possible for a visit   [address] [place] [id number] [phone]               Elsa Palmer MD  03/19/24 0153

## 2024-03-21 ENCOUNTER — TELEPHONE (OUTPATIENT)
Dept: FAMILY MEDICINE | Facility: CLINIC | Age: 39
End: 2024-03-21
Payer: MEDICAID

## 2024-03-21 NOTE — TELEPHONE ENCOUNTER
----- Message from Alecia Mg NP sent at 3/21/2024 11:45 AM CDT -----  Can we see if this patient would like or needs a referral to any providers for his MRI of his back? I know he didn't really want further treatment last time we talked but just wanted to know what was going on. MRI shows some wear and tear and I can set him him with PM&R or Neurosurgeon if he would like.   ----- Message -----  From: Interface, Rad Results In  Sent: 3/15/2024   3:08 PM CDT  To: Alecia Mg NP

## 2024-03-22 ENCOUNTER — PATIENT MESSAGE (OUTPATIENT)
Dept: FAMILY MEDICINE | Facility: CLINIC | Age: 39
End: 2024-03-22
Payer: MEDICAID

## 2024-03-22 ENCOUNTER — TELEPHONE (OUTPATIENT)
Dept: FAMILY MEDICINE | Facility: CLINIC | Age: 39
End: 2024-03-22
Payer: MEDICAID

## 2024-03-26 ENCOUNTER — TELEPHONE (OUTPATIENT)
Dept: FAMILY MEDICINE | Facility: CLINIC | Age: 39
End: 2024-03-26
Payer: MEDICAID

## 2024-03-26 NOTE — TELEPHONE ENCOUNTER
----- Message from Alecia Mg NP sent at 3/21/2024 11:45 AM CDT -----  Can we see if this patient would like or needs a referral to any providers for his MRI of his back? I know he didn't really want further treatment last time we talked but just wanted to know what was going on. MRI shows some wear and tear and I can set him him with PM&R or Neurosurgeon if he would like.   ----- Message -----  From: Interface, Rad Results In  Sent: 3/15/2024   3:08 PM CDT  To: Alecia Mg NP       ----- Message from Maren Mccrary sent at 4/6/2022  3:27 PM CDT -----  Patient called and stated that she need a refill of her lisinopril,gabapentin,oxycodone,and diazepam called into [facility] if any questions please give her a call at [phone]

## 2024-09-26 ENCOUNTER — OFFICE VISIT (OUTPATIENT)
Dept: ORTHOPEDICS | Facility: CLINIC | Age: 39
End: 2024-09-26
Payer: MEDICAID

## 2024-09-26 VITALS
TEMPERATURE: 98 F | WEIGHT: 179.88 LBS | SYSTOLIC BLOOD PRESSURE: 100 MMHG | HEART RATE: 65 BPM | DIASTOLIC BLOOD PRESSURE: 63 MMHG | HEIGHT: 72 IN | BODY MASS INDEX: 24.36 KG/M2

## 2024-09-26 DIAGNOSIS — M23.92 INTERNAL DERANGEMENT OF BOTH KNEES: Primary | ICD-10-CM

## 2024-09-26 DIAGNOSIS — M23.91 INTERNAL DERANGEMENT OF BOTH KNEES: Primary | ICD-10-CM

## 2024-09-26 PROCEDURE — 99214 OFFICE O/P EST MOD 30 MIN: CPT | Mod: PBBFAC

## 2024-09-26 NOTE — PROGRESS NOTES
Subjective:    Patient ID: Nick Valente is a 39 y.o. male  who presented to [facility] for follow up.      Chief Complaint: Pain of the Left Knee and Pain of the Right Knee      History of Present Illness:  Nick Valente with a PMHx of right ACL repair (in 2001)  presented today for follow up of acute on chronic worsening bilateral knee pain (R>L) and received a CSI bilaterally 2/2024, initial acute on chronic aggravating injury was 6/2023. He states most of his pain is on the bilateral medial aspect. He rates the pain 8 out of 10 with exercise, 5 out of 10 at rest, noting that the pain is constant and aching sensation. He also has had an increasing a locking/clicking sensation R>L. The patient was working with PT but was very sick for a month and did not attend. He would like to get another script for PT as he was improving slightly. He continues to work out but no longer is running (use to run about 4 miles a day) and is no longer playing sports which he use to enjoy, due to his knee pain. Inciting event: injured while playing softball and rounding the bases 6/2023 . Pain is aggravated by  exercise .  Patient has had prior knee problems. Evaluation to date: plain films. Treatment to date: oral analgesics and ice/heat. Expectations for today's visit includes updated PT form and consider CSI.  Occupation: real estate. PCP is Alecia Mg NP.    Knee Review of Systems:  Swelling?  no  Instability?  no  Mechanical sx?  yes  <30 min AM stiffness? no  Limited ROM? yes  Fever/Chills? no       Objective:      Physical Exam:    /63   Pulse 65   Temp 97.6 °F (36.4 °C)   Ht 6' (1.829 m)   Wt 81.6 kg (179 lb 14.3 oz)   BMI 24.40 kg/m²     Ortho/SPM Exam    Appearance:  Normal gait/station  FWB  Alignment: Left: normal Right: normal   Soft tissue swelling: Left: no Right: no  Effusion: Left:  Negative Right: Negative  Erythema: Left no Right: no  Ecchymosis:  Left: no Right: no  Atrophy: Left: no Right: no    Palpation:  Knee Tenderness: Left: Medial joint line Right: Medial joint line    Range of motion:  Flexion (140): Left:  140 Right: 140  Extension (0): Left: 0 Right: 0    Strength:  Extension: Left 5/5  Pain: no     Right 5/5 Pain: no  Flexion: Left 5/5 Pain: no Right   5/5 Pain: no        Special Tests:  Ballotable Effusion:Left: Negative Right: Negative   Fluid Wave: Left: Negative Right: Negative   Crepitus: Left: Negative Right: Positive   Varus: @ 0, Left Negative Right: Negative.  @ 30, Left Negative  Right Negative   Valgus: @ 0, Left Negative Right: Positive.  @ 30, Left Negative  Right Positive  Lachman: Left: Negative Right: Negative   Ant Drawer: Left: Negative Right: Negative   Posterior Drawer: Left: Negative Right: Negative   Dial Test: Left: Not performed Right: Not performed   Janes: Left: Negative Right: Positive   Apley's: Left: Not performed Right: Not performed  Thessaly's: Left: Not performed Right: Not performed   Noble Compression: Left: Not performed Right: Not performed   Derian: Left: Not performed Right: Not performed       General appearance: NAD  Peripheral pulses: normal bilaterally   Reflexes: Left: normal Right normal   Sensation: normal    Labs:  Last A1c: The patient doesn't have any registry metric data available     Imaging:   Previous images reviewed.  X-rays ordered and performed today: no  My Interpretation bilateral knee XR 2/26/24:  no fracture, dislocation, or swelling bilaterally. Left knee notes no acute or chronic abnormalities,     Assessment:        Encounter Diagnoses   Code Name Primary?    M23.91, M23.92 Internal derangement of both knees Yes        Plan:       Dx: internal derangement of bilateral knees, previous US evaluation in office noted a tear in the medial and lateral meniscus bilaterally.   Treatment Plan:   -Discussed with patient diagnosis, prognosis, and treatment recommendations. Education provided.     -Home physical therapy exercise handouts provided to patient.   -Formal PT script provided to patient. You may take this script to whichever physical therapist you would like to go to.   -topical hot or cold therapy  -Over the counter NSAID and/or tylenol provided you do not have contraindications such as but not limited to liver or kidney disease or uncontrolled blood pressure. If you're doctors have told you to to not take them based on your health, do not take them.   -Due to previous US evaluation and limited improvement in patients symptoms affecting his daily activities, will obtain MRI to further evaluate internal derangement of bilateral knees. Will review results with patient at next visit after MRI is obtained.   Imaging: prior radiological studies independently reviewed; discussed with patient; agree with radiologist interpretation.   Weight Management: is paramount. Maintain healthy weight of a BMI of <24.9..   Procedure: Discussed CSI/VSI as treatment options; as the patient may require surgery will hold off on CSI at this time till MRI has been obtained.  Activity: Activity as tolerated; HEP to include aerobic conditioning and strength training with non-painful activity. ROM/STG exercises. Proper footware; assistive devises to avoid limping.   Therapy: Physical Therapy  Medication: CONTINUE over-the-counter acetaminophen (Tylenol 1000 mg three times per day as needed)  CONTINUE over-the-counter NSAIDs (ibuprofen 200mg three tablets three times a day as needed). Please see your primary care physician for further refills.  RTC: after imaging test complete.           This note is dictated using the [employer] Fluency Direct word recognition program. There are word recognition mistakes that are occasionally missed on review.     Batsheva Roberts MD  Sports Medicine Fellow

## 2024-10-11 ENCOUNTER — TELEPHONE (OUTPATIENT)
Dept: ORTHOPEDICS | Facility: CLINIC | Age: 39
End: 2024-10-11
Payer: MEDICAID

## 2024-10-11 NOTE — TELEPHONE ENCOUNTER
----- Message from Frances Cantu MD sent at 10/11/2024 10:43 AM CDT -----  Please contact patient to schedule a follow-up on a day with Batsheva Roberts MD next available clinic.  OK to double book within the next 2 weeks per Dr. Cantu.

## 2024-10-14 ENCOUNTER — TELEPHONE (OUTPATIENT)
Dept: ORTHOPEDICS | Facility: CLINIC | Age: 39
End: 2024-10-14
Payer: MEDICAID

## 2024-10-14 NOTE — TELEPHONE ENCOUNTER
----- Message from Batsheva Roberts sent at 10/14/2024  8:54 AM CDT -----  Please schedule this patient for next available for MRI review with me. Thank you. Okay to overbook in the next 2 weeks.

## 2024-10-17 ENCOUNTER — OFFICE VISIT (OUTPATIENT)
Dept: ORTHOPEDICS | Facility: CLINIC | Age: 39
End: 2024-10-17
Payer: MEDICAID

## 2024-10-17 VITALS
DIASTOLIC BLOOD PRESSURE: 59 MMHG | SYSTOLIC BLOOD PRESSURE: 112 MMHG | WEIGHT: 185.63 LBS | HEART RATE: 58 BPM | HEIGHT: 72 IN | TEMPERATURE: 98 F | BODY MASS INDEX: 25.14 KG/M2

## 2024-10-17 DIAGNOSIS — M25.569 PAIN OF KNEE JOINT WITH OSTEOCHONDRAL INJURY: Primary | ICD-10-CM

## 2024-10-17 PROCEDURE — 99213 OFFICE O/P EST LOW 20 MIN: CPT | Mod: PBBFAC

## 2024-10-17 NOTE — PROGRESS NOTES
Subjective:    Patient ID: Nick Valente is a 39 y.o. male  who presented to [facility] for follow up.    Chief Complaint: Pain of the Left Knee (MRI RESULTS KAYLIE KNEE) and Pain of the Right Knee      History of Present Illness:  Nick Valente with a PMHx of right ACL repair (in 2001) presents today for MRI follow up due to his acute on chronic worsening bilateral medial knee pain (R>L) with an aggravating injury 6/2023 while rounding the bases playing softball. He ranks the pain an 8 out of 10 with exercise and describes it as a constant aching sensation. He continues to have locking/clicking sensation R>L. Patient has been working with PT and doing HEP. He has continued to exercise but has transitioned to swimming instead of running (previously would run about 4 miles a day) to decrease knee pain. He is no longer playing sports which he use to enjoy due to his knee pain. Inciting event: rounding the bases of a softball game 6/2023. Pain is aggravated by  exercising .  Patient has had prior knee problems. Evaluation to date: plain films, MRI, and PT evaluation. Treatment to date: avoidance of activity, oral analgesics, CSI, PT, home exercises, and ice/heat. Expectations for today's visit includes discuss MRI results.  Occupation: Real estate. PCP is BRANDON Mejia.    MRI Right Knee on 9/26/24: ACL reconstruction noted with intact graph, chronic vs postoperative changes at hte peripheral posterior horn of the medial meniscus without noted tear, osteochondral body noted at the anterior intercondylar notch    MRI Left Knee on 9/26/24: no meniscal tear or acute pathology noted    Knee Review of Systems:  Swelling?  no  Instability?  no  Mechanical sx?  yes  <30 min AM stiffness? no  Limited ROM? yes  Fever/Chills? no    Comorbid Conditions:  None         Objective:      Physical Exam:    BP (!) 112/59 (Patient Position: Sitting)   Pulse (!) 58   Temp 97.6 °F  (36.4 °C)   Ht 6' (1.829 m)   Wt 84.2 kg (185 lb 10 oz)   BMI 25.18 kg/m²     Appearance:  Normal gait/station  FWB  Alignment: Left: normal Right: normal   Soft tissue swelling: Left: no Right: no  Effusion: Left:  Negative Right: Negative  Erythema: Left no Right: no  Ecchymosis: Left: no Right: no  Atrophy: Left: no Right: no    Palpation:  Knee Tenderness: Left: Medial joint line Right: Medial joint line    Range of motion:  Flexion (140): Left:  140 Right: 140  Extension (0): Left: 0 Right: 0    Strength:  Extension: Left 5/5  Pain: no     Right 5/5 Pain: no  Flexion: Left 5/5 Pain: no Right   5/5 Pain: no        Special Tests:  Ballotable Effusion:Left: Negative Right: Negative   Fluid Wave: Left: Negative Right: Negative   Crepitus: Left: Negative Right: Positive   Varus: @ 0, Left Negative Right: Negative.  @ 30, Left Negative  Right Negative   Valgus: @ 0, Left Negative Right: Positive.  @ 30, Left Negative  Right Positive  Lachman: Left: Negative Right: Negative   Ant Drawer: Left: Negative Right: Negative   Posterior Drawer: Left: Negative Right: Negative   Dial Test: Left: Not performed Right: Not performed   Janes: Left: Negative Right: Positive   Apley's: Left: Not performed Right: Not performed  Thessaly's: Left: Not performed Right: Not performed   Noble Compression: Left: Not performed Right: Not performed   Derian: Left: Not performed Right: Not performed       General appearance: NAD  Peripheral pulses: normal bilaterally   Reflexes: Left: normal Right normal   Sensation: normal    Labs:  Last A1c: The patient doesn't have any registry metric data available     Imaging:   Previous images reviewed.  X-rays ordered and performed today: no  MRI Right Knee on 9/26/24: ACL reconstruction noted with intact graph, chronic vs postoperative changes at the peripheral posterior horn of the medial meniscus without noted tear, osteochondral body noted at the anterior intercondylar notch  MRI Left Knee on  9/26/24: no meniscal tear or acute pathology noted      Assessment:        Encounter Diagnoses   Code Name Primary?    M25.569 Pain of knee joint with osteochondral injury Yes        Plan:        Dx: acute osteochondral injury of the right knee - failure to improve with conservative treatment  Treatment Plan: Discussed with patient diagnosis and treatment recommendations. Recommend conservative treatment to include: avoidance of aggravating activity, significant modification of daily activities, hot/cold therapies, topical and oral medications, braces, HEP/PT/OT, and injections.   Discussed orthopedic referral to further discuss surgical intervention if appropriate.   Imaging: MRI right knee noted postsurgical changes as well as osteochondral body at the anterior intercondylar notch. MRI left knee noted no acute pathology.  Weight Management: is paramount. Maintain healthy weight of a BMI of <24.9.  Procedure: Discussed injections as treatment options; recommend referral to orthopedics for discussion about surgical intervention.   Activity: Activity as tolerated; HEP to include aerobic conditioning and strength training with non-painful activity. ROM/STG exercises. Proper footware; assistive devises to avoid limping.   Therapy: Physical Therapy  Medication: CONTINUE over-the-counter acetaminophen (Tylenol 1000 mg three times per day as needed)  CONTINUE over-the-counter NSAIDs (ibuprofen 200mg three tablets three times a day as needed). Please see your primary care physician for further refills.  RTC: PRN, please refer back to sports medicine for non-surgical management if indicated.         This note is dictated using the M*Modal Fluency Direct word recognition program. There are word recognition mistakes that are occasionally missed on review.     Batsheva Roberts MD  Sports Medicine Fellow

## 2024-10-22 NOTE — PROGRESS NOTES
Faculty Attestation: Nick Valente  was seen in Sports Medicine Clinic. Discussed with Dr. Roberts at the time of the visit. History of Present Illness, Physical Exam, and Assessment and Plan reviewed. Treatment plan is reasonable and appropriate. Compliance with treatment recommendations is important.  Radiology images independently reviewed and agree with radiologist interpretation. No procedure was performed.     Frances Cantu MD  Sports Medicine      Patient Type: Established patient to [facility]  Problem Type: Established condition to [facility]  Condition: Chronic Condition - with progression of symptoms that have failed improvement with previous conservative treatment (4)

## 2024-11-04 ENCOUNTER — CLINICAL SUPPORT (OUTPATIENT)
Dept: ORTHOPEDICS | Facility: CLINIC | Age: 39
End: 2024-11-04
Payer: MEDICAID

## 2024-11-04 VITALS
TEMPERATURE: 98 F | SYSTOLIC BLOOD PRESSURE: 113 MMHG | HEIGHT: 72 IN | BODY MASS INDEX: 24.96 KG/M2 | WEIGHT: 184.31 LBS | DIASTOLIC BLOOD PRESSURE: 76 MMHG | HEART RATE: 62 BPM

## 2024-11-04 DIAGNOSIS — M23.41 LOOSE BODY IN KNEE, RIGHT KNEE: ICD-10-CM

## 2024-11-04 DIAGNOSIS — M23.41 LOOSE BODY OF RIGHT KNEE: Primary | ICD-10-CM

## 2024-11-04 PROCEDURE — 99214 OFFICE O/P EST MOD 30 MIN: CPT | Mod: PBBFAC

## 2024-11-04 RX ORDER — MUPIROCIN 20 MG/G
OINTMENT TOPICAL
OUTPATIENT
Start: 2024-11-04

## 2024-11-04 RX ORDER — CEFAZOLIN SODIUM 2 G/50ML
2 SOLUTION INTRAVENOUS
OUTPATIENT
Start: 2024-11-04

## 2024-11-04 NOTE — PROGRESS NOTES
Faculty Attestation: Nick Valente  was seen at [facility] in the Orthopaedic Clinic. Discussed with the resident at the time of the visit. History of Present Illness, Physical Exam, and Assessment and Plan reviewed. Treatment plan is reasonable and appropriate. Compliance with treatment recommendations is important. No procedure was performed.     Jose Manuel Pérez MD  Orthopaedic Surgery

## 2024-11-04 NOTE — PROGRESS NOTES
[facility]  Established Patient Office Visit  11/04/2024       Patient ID: Nick Valente  YOB: 1985  MRN: [id number]    Chief Complaint: Pain of the Left Knee (Discuss surgery  Taking OTC as needed did do PT and home exercises which helped very little ) and Pain of the Right Knee      Past Orthopaedic Surgeries:  Right ACL reconstruction 2001    HPI:  Nick Valente is a 39 y.o. male with history of previous right ACL reconstruction in 2001 who sustained a twisting injury to the right knee in June of 2023 while playing softball.  He reports immediate pain and swelling to the right knee and difficulty with full range of motion.  He has had continued symptoms over the left knee and has not been able to return to his previous level of activity.  He has tried to run but continues to have pain, mechanical symptoms including catching and locking of the right knee.  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  He is currently in physical therapy for his bilateral knees.  He reports left knee pain has been going on for about the past year but right bothers him more than left.    12 point ROS performed and negative except as above.     Past Medical History:    Past Medical History:   Diagnosis Date    Asthma      Past Surgical History:   Procedure Laterality Date    Right ACL repair       Family History   Problem Relation Name Age of Onset    Arthritis Mother Vanessa Valente     Hyperlipidemia Mother Vanessa Valente     Hypertension Mother Vanessa Valente     Diabetes Father Nick Valente     Heart disease Father Nick Valente     Hypertension Father Nick Valente     Stroke Father Nick Valente      Social History     Socioeconomic History    Marital status: Single   Tobacco Use    Smoking status: Never    Smokeless tobacco: Never   Substance and Sexual Activity    Alcohol use: Never    Drug use: Never    Sexual activity: Not Currently     Partners: Female     Social  Drivers of Health     Financial Resource Strain: Low Risk  (2/3/2024)    Overall Financial Resource Strain (CARDIA)     Difficulty of Paying Living Expenses: Not very hard   Food Insecurity: Food Insecurity Present (2/3/2024)    Hunger Vital Sign     Worried About Running Out of Food in the Last Year: Sometimes true     Ran Out of Food in the Last Year: Sometimes true   Transportation Needs: No Transportation Needs (2/3/2024)    PRAPARE - Transportation     Lack of Transportation (Medical): No     Lack of Transportation (Non-Medical): No   Physical Activity: Sufficiently Active (2/3/2024)    Exercise Vital Sign     Days of Exercise per Week: 5 days     Minutes of Exercise per Session: 150+ min   Stress: No Stress Concern Present (2/3/2024)    [employer] - Occupational Stress Questionnaire     Feeling of Stress : Not at all   Housing Stability: Low Risk  (2/3/2024)    Housing Stability Vital Sign     Unable to Pay for Housing in the Last Year: No     Number of Places Lived in the Last Year: 1     Unstable Housing in the Last Year: No     Medication List with Changes/Refills   Current Medications    ALBUTEROL (VENTOLIN HFA) 90 MCG/ACTUATION INHALER    Inhale 2 puffs into the lungs every 6 (six) hours as needed for Wheezing. Rescue    FLUTICASONE PROPIONATE (FLONASE) 50 MCG/ACTUATION NASAL SPRAY    1 spray (50 mcg total) by Each Nostril route once daily.    HYDROCODONE-ACETAMINOPHEN (NORCO) 7.5-325 MG PER TABLET    Take 1 tablet by mouth every 6 (six) hours as needed for Pain.    IBUPROFEN (ADVIL,MOTRIN) 800 MG TABLET    Take 1 tablet (800 mg total) by mouth every 6 (six) hours as needed for Pain.    LORATADINE (CLARITIN) 10 MG TABLET    Take 1 tablet (10 mg total) by mouth once daily.    TRETINOIN (RETIN-A) 0.1 % CREAM    Apply topically every evening.     Review of patient's allergies indicates:  No Known Allergies    Physical Exam:  RLE  Skin atraumatic; previous surgical incisions well  healed  No tenderness to palpation medial or lateral joint line.  TTP over anterior aspect of inferior patella  ROM 0-120 degrees flexion  Stable anterior/posterior   Stable varus/valgus  No groin pain with rotation of hip  Motor intact TA/GS/EHL/FHL  SILT SP/DP/Sa/Gillespie/T  Toes WWP      Imaging independently interpreted:  MRI right knee:  Previous ACL graft intact.  No obvious meniscal tear.  Osteochondral loose body in the anterior aspect of the intercondylar notch.    Assessment and Plan:    Nick Valente is a 39 y.o. male with history of previous right knee ACL reconstruction 2001 with recent injury to the right knee while playing softball in June 20, 2023 resulting in osteochondral loose body of the right knee.    -discussed with the patient that given minimal improvement with physical therapy and continued symptoms as well as risk of cartilage/ joint damage with loose body in his right knee, we recommend surgical intervention including right knee arthroscopy and removal of loose body.  - patient is going on in town for 3 weeks in November.  We will schedule patient for right knee arthroscopy with loose body removal on 12/12/24 with Dr. Sevilla.  - WBAT BLE    Magda Morel MD PGY-3  [employer] Orthopaedic Surgery           Orders Placed This Encounter    Diet NPO    Cleanse with Chlorhexidine (CHG)    Place REBEL hose    Place sequential compression device    IP VTE LOW RISK PATIENT    Case Request Operating Room: ARTHROSCOPY, KNEE, WITH LOOSE BODY REMOVAL

## 2024-12-03 ENCOUNTER — ANESTHESIA EVENT (OUTPATIENT)
Dept: SURGERY | Facility: HOSPITAL | Age: 39
End: 2024-12-03
Payer: MEDICAID

## 2024-12-03 NOTE — ANESTHESIA PREPROCEDURE EVALUATION
Nick Valente is a 39 y.o. male PRESENTING FOR ARTHROSCOPY, KNEE, WITH LOOSE BODY REMOVAL (Right) with a history of   -Loose body of right knee   -ASTHMA  -CHRONIC NECK AND BACK PAIN  -MILD HLD    BETA-BLOCKER: NONE    New Orders for Anesthesia: NONE    Patient Active Problem List   Diagnosis    Chronic neck pain    Chronic low back pain    Chronic right shoulder pain    History of asthma    Environmental allergies    Chronic pain of right knee     Pre-op Assessment    I have reviewed the Patient Summary Reports.     I have reviewed the Nursing Notes. I have reviewed the NPO Status.   I have reviewed the Medications.     Review of Systems  Anesthesia Hx:  No problems with previous Anesthesia   History of prior surgery of interest to airway management or planning:          Denies Family Hx of Anesthesia complications.    Denies Personal Hx of Anesthesia complications.                    Hematology/Oncology:  Hematology Normal   Oncology Normal                                   EENT/Dental:  EENT/Dental Normal           Cardiovascular:                hyperlipidemia                               Pulmonary:    Asthma                    Renal/:  Renal/ Normal                 Hepatic/GI:  Hepatic/GI Normal                    Musculoskeletal:  Musculoskeletal Normal                Neurological:  Neurology Normal                                      Endocrine:  Endocrine Normal            Dermatological:  Skin Normal    Psych:  Psychiatric Normal                  Physical Exam  General: Alert    Airway:  Mallampati: I / I  Mouth Opening: Normal  TM Distance: Normal  Tongue: Normal  Neck ROM: Normal ROM    Dental:  Intact    Lab Results   Component Value Date    WBC 4.78 02/05/2024    HGB 15.8 02/05/2024    HCT 47.5 02/05/2024    MCV 78.1 (L) 02/05/2024     02/05/2024       CMP  Sodium   Date Value Ref Range Status   02/05/2024 140 136 - 145 mmol/L Final     Potassium   Date Value Ref Range Status   02/05/2024  4.0 3.5 - 5.1 mmol/L Final     Chloride   Date Value Ref Range Status   02/05/2024 103 98 - 107 mmol/L Final     CO2   Date Value Ref Range Status   02/05/2024 30 (H) 22 - 29 mmol/L Final     Blood Urea Nitrogen   Date Value Ref Range Status   02/05/2024 20.1 8.9 - 20.6 mg/dL Final     Creatinine   Date Value Ref Range Status   02/05/2024 0.93 0.73 - 1.18 mg/dL Final     Calcium   Date Value Ref Range Status   02/05/2024 9.6 8.4 - 10.2 mg/dL Final     Albumin   Date Value Ref Range Status   02/05/2024 4.3 3.5 - 5.0 g/dL Final     Bilirubin Total   Date Value Ref Range Status   02/05/2024 0.5 <=1.5 mg/dL Final     ALP   Date Value Ref Range Status   02/05/2024 56 40 - 150 unit/L Final     AST   Date Value Ref Range Status   02/05/2024 23 5 - 34 unit/L Final     ALT   Date Value Ref Range Status   02/05/2024 23 0 - 55 unit/L Final     eGFR   Date Value Ref Range Status   02/05/2024 >60 mls/min/1.73/m2 Final         Anesthesia Plan  Type of Anesthesia, risks & benefits discussed:    Anesthesia Type: Gen Supraglottic Airway  Intra-op Monitoring Plan: Standard ASA Monitors  Post Op Pain Control Plan: IV/PO Opioids PRN  Induction:  IV  Airway Plan: Direct  Informed Consent: Informed consent signed with the Patient representative and all parties understand the risks and agree with anesthesia plan.  All questions answered. Patient consented to blood products? No  ASA Score: 2  Day of Surgery Review of History & Physical: H&P Update referred to the surgeon/provider.    Ready For Surgery From Anesthesia Perspective.     .

## 2024-12-06 RX ORDER — FEXOFENADINE HCL 60 MG/1
60 TABLET, FILM COATED ORAL
COMMUNITY

## 2024-12-11 ENCOUNTER — PATIENT MESSAGE (OUTPATIENT)
Dept: SURGERY | Facility: HOSPITAL | Age: 39
End: 2024-12-11
Payer: MEDICAID

## 2024-12-12 ENCOUNTER — ANESTHESIA (OUTPATIENT)
Dept: SURGERY | Facility: HOSPITAL | Age: 39
End: 2024-12-12
Payer: MEDICAID

## 2024-12-12 ENCOUNTER — HOSPITAL ENCOUNTER (OUTPATIENT)
Facility: HOSPITAL | Age: 39
Discharge: HOME | End: 2024-12-12
Attending: RADIOLOGY | Admitting: REHABILITATION UNIT
Payer: MEDICAID

## 2024-12-12 DIAGNOSIS — G89.29 CHRONIC PAIN OF RIGHT KNEE: Primary | ICD-10-CM

## 2024-12-12 DIAGNOSIS — M23.41 LOOSE BODY IN KNEE, RIGHT KNEE: ICD-10-CM

## 2024-12-12 DIAGNOSIS — M23.41 LOOSE BODY OF RIGHT KNEE: ICD-10-CM

## 2024-12-12 DIAGNOSIS — M25.561 CHRONIC PAIN OF RIGHT KNEE: Primary | ICD-10-CM

## 2024-12-12 PROCEDURE — 71000033 HC RECOVERY, INTIAL HOUR: Performed by: SPECIALIST

## 2024-12-12 PROCEDURE — 37000009 HC ANESTHESIA EA ADD 15 MINS: Performed by: SPECIALIST

## 2024-12-12 PROCEDURE — 36000711: Performed by: SPECIALIST

## 2024-12-12 PROCEDURE — 63600175 PHARM REV CODE 636 W HCPCS: Performed by: SPECIALIST

## 2024-12-12 PROCEDURE — 63600175 PHARM REV CODE 636 W HCPCS: Performed by: ANESTHESIOLOGY

## 2024-12-12 PROCEDURE — 25000003 PHARM REV CODE 250: Performed by: SPECIALIST

## 2024-12-12 PROCEDURE — 27201423 OPTIME MED/SURG SUP & DEVICES STERILE SUPPLY: Performed by: SPECIALIST

## 2024-12-12 PROCEDURE — 63600175 PHARM REV CODE 636 W HCPCS: Performed by: NURSE ANESTHETIST, CERTIFIED REGISTERED

## 2024-12-12 PROCEDURE — 36000710: Performed by: SPECIALIST

## 2024-12-12 PROCEDURE — 37000008 HC ANESTHESIA 1ST 15 MINUTES: Performed by: SPECIALIST

## 2024-12-12 PROCEDURE — 63600175 PHARM REV CODE 636 W HCPCS

## 2024-12-12 PROCEDURE — 29874 ARTHRS KNEE SURG RMV LOOS/FB: CPT | Mod: RT,,, | Performed by: SPECIALIST

## 2024-12-12 PROCEDURE — 71000016 HC POSTOP RECOV ADDL HR: Performed by: SPECIALIST

## 2024-12-12 PROCEDURE — 71000015 HC POSTOP RECOV 1ST HR: Performed by: SPECIALIST

## 2024-12-12 RX ORDER — ONDANSETRON 4 MG/1
4 TABLET, FILM COATED ORAL EVERY 8 HOURS PRN
Qty: 15 TABLET | Refills: 0 | Status: SHIPPED | OUTPATIENT
Start: 2024-12-12 | End: 2024-12-19

## 2024-12-12 RX ORDER — EPINEPHRINE 1 MG/ML
INJECTION, SOLUTION, CONCENTRATE INTRAVENOUS
Status: DISCONTINUED | OUTPATIENT
Start: 2024-12-12 | End: 2024-12-12 | Stop reason: HOSPADM

## 2024-12-12 RX ORDER — HYDROMORPHONE HYDROCHLORIDE 1 MG/ML
0.5 INJECTION, SOLUTION INTRAMUSCULAR; INTRAVENOUS; SUBCUTANEOUS EVERY 5 MIN PRN
Status: DISCONTINUED | OUTPATIENT
Start: 2024-12-12 | End: 2024-12-12 | Stop reason: HOSPADM

## 2024-12-12 RX ORDER — IPRATROPIUM BROMIDE AND ALBUTEROL SULFATE 2.5; .5 MG/3ML; MG/3ML
3 SOLUTION RESPIRATORY (INHALATION) ONCE AS NEEDED
Status: DISCONTINUED | OUTPATIENT
Start: 2024-12-12 | End: 2024-12-12 | Stop reason: HOSPADM

## 2024-12-12 RX ORDER — HYDROMORPHONE HYDROCHLORIDE 1 MG/ML
0.2 INJECTION, SOLUTION INTRAMUSCULAR; INTRAVENOUS; SUBCUTANEOUS EVERY 5 MIN PRN
Status: DISCONTINUED | OUTPATIENT
Start: 2024-12-12 | End: 2024-12-12 | Stop reason: HOSPADM

## 2024-12-12 RX ORDER — OXYCODONE AND ACETAMINOPHEN 5; 325 MG/1; MG/1
2 TABLET ORAL ONCE
Status: COMPLETED | OUTPATIENT
Start: 2024-12-12 | End: 2024-12-12

## 2024-12-12 RX ORDER — DEXAMETHASONE SODIUM PHOSPHATE 4 MG/ML
INJECTION, SOLUTION INTRA-ARTICULAR; INTRALESIONAL; INTRAMUSCULAR; INTRAVENOUS; SOFT TISSUE
Status: DISCONTINUED | OUTPATIENT
Start: 2024-12-12 | End: 2024-12-12

## 2024-12-12 RX ORDER — MEPERIDINE HYDROCHLORIDE 25 MG/ML
12.5 INJECTION INTRAMUSCULAR; INTRAVENOUS; SUBCUTANEOUS ONCE
Status: COMPLETED | OUTPATIENT
Start: 2024-12-12 | End: 2024-12-12

## 2024-12-12 RX ORDER — DIPHENHYDRAMINE HYDROCHLORIDE 50 MG/ML
25 INJECTION INTRAMUSCULAR; INTRAVENOUS ONCE AS NEEDED
Status: DISCONTINUED | OUTPATIENT
Start: 2024-12-12 | End: 2024-12-12 | Stop reason: HOSPADM

## 2024-12-12 RX ORDER — ONDANSETRON HYDROCHLORIDE 2 MG/ML
4 INJECTION, SOLUTION INTRAVENOUS ONCE
Status: DISCONTINUED | OUTPATIENT
Start: 2024-12-12 | End: 2024-12-12 | Stop reason: HOSPADM

## 2024-12-12 RX ORDER — PROCHLORPERAZINE EDISYLATE 5 MG/ML
5 INJECTION INTRAMUSCULAR; INTRAVENOUS ONCE AS NEEDED
Status: DISCONTINUED | OUTPATIENT
Start: 2024-12-12 | End: 2024-12-12 | Stop reason: HOSPADM

## 2024-12-12 RX ORDER — KETOROLAC TROMETHAMINE 30 MG/ML
INJECTION, SOLUTION INTRAMUSCULAR; INTRAVENOUS
Status: DISCONTINUED | OUTPATIENT
Start: 2024-12-12 | End: 2024-12-12

## 2024-12-12 RX ORDER — MIDAZOLAM HYDROCHLORIDE 2 MG/2ML
2 INJECTION, SOLUTION INTRAMUSCULAR; INTRAVENOUS ONCE AS NEEDED
Status: COMPLETED | OUTPATIENT
Start: 2024-12-12 | End: 2024-12-12

## 2024-12-12 RX ORDER — FENTANYL CITRATE 50 UG/ML
INJECTION, SOLUTION INTRAMUSCULAR; INTRAVENOUS
Status: DISCONTINUED | OUTPATIENT
Start: 2024-12-12 | End: 2024-12-12

## 2024-12-12 RX ORDER — PROPOFOL 10 MG/ML
VIAL (ML) INTRAVENOUS
Status: DISCONTINUED | OUTPATIENT
Start: 2024-12-12 | End: 2024-12-12

## 2024-12-12 RX ORDER — OXYCODONE HYDROCHLORIDE 5 MG/1
5 TABLET ORAL EVERY 6 HOURS PRN
Qty: 15 TABLET | Refills: 0 | Status: SHIPPED | OUTPATIENT
Start: 2024-12-12 | End: 2024-12-18 | Stop reason: SDUPTHER

## 2024-12-12 RX ORDER — GLUCAGON 1 MG
1 KIT INJECTION
Status: DISCONTINUED | OUTPATIENT
Start: 2024-12-12 | End: 2024-12-12 | Stop reason: HOSPADM

## 2024-12-12 RX ORDER — ONDANSETRON HYDROCHLORIDE 2 MG/ML
INJECTION, SOLUTION INTRAMUSCULAR; INTRAVENOUS
Status: DISCONTINUED | OUTPATIENT
Start: 2024-12-12 | End: 2024-12-12

## 2024-12-12 RX ORDER — LIDOCAINE HYDROCHLORIDE 20 MG/ML
INJECTION, SOLUTION EPIDURAL; INFILTRATION; INTRACAUDAL; PERINEURAL
Status: DISCONTINUED | OUTPATIENT
Start: 2024-12-12 | End: 2024-12-12

## 2024-12-12 RX ORDER — CEFAZOLIN SODIUM 1 G/3ML
2 INJECTION, POWDER, FOR SOLUTION INTRAMUSCULAR; INTRAVENOUS
Status: COMPLETED | OUTPATIENT
Start: 2024-12-12 | End: 2024-12-12

## 2024-12-12 RX ORDER — ACETAMINOPHEN 500 MG
1000 TABLET ORAL EVERY 8 HOURS
Qty: 90 TABLET | Refills: 0 | Status: SHIPPED | OUTPATIENT
Start: 2024-12-12

## 2024-12-12 RX ORDER — MELOXICAM 15 MG/1
15 TABLET ORAL DAILY
Qty: 14 TABLET | Refills: 0 | Status: SHIPPED | OUTPATIENT
Start: 2024-12-12

## 2024-12-12 RX ORDER — SODIUM CHLORIDE, SODIUM LACTATE, POTASSIUM CHLORIDE, CALCIUM CHLORIDE 600; 310; 30; 20 MG/100ML; MG/100ML; MG/100ML; MG/100ML
INJECTION, SOLUTION INTRAVENOUS CONTINUOUS
Status: DISCONTINUED | OUTPATIENT
Start: 2024-12-12 | End: 2024-12-12 | Stop reason: HOSPADM

## 2024-12-12 RX ADMIN — MEPERIDINE HYDROCHLORIDE 12.5 MG: 25 INJECTION INTRAMUSCULAR; INTRAVENOUS; SUBCUTANEOUS at 08:12

## 2024-12-12 RX ADMIN — DEXAMETHASONE SODIUM PHOSPHATE 8 MG: 4 INJECTION, SOLUTION INTRA-ARTICULAR; INTRALESIONAL; INTRAMUSCULAR; INTRAVENOUS; SOFT TISSUE at 07:12

## 2024-12-12 RX ADMIN — KETOROLAC TROMETHAMINE 30 MG: 30 INJECTION, SOLUTION INTRAMUSCULAR at 07:12

## 2024-12-12 RX ADMIN — PROPOFOL 300 MG: 10 INJECTION, EMULSION INTRAVENOUS at 07:12

## 2024-12-12 RX ADMIN — OXYCODONE HYDROCHLORIDE AND ACETAMINOPHEN 2 TABLET: 5; 325 TABLET ORAL at 09:12

## 2024-12-12 RX ADMIN — MIDAZOLAM HYDROCHLORIDE 2 MG: 1 INJECTION, SOLUTION INTRAMUSCULAR; INTRAVENOUS at 06:12

## 2024-12-12 RX ADMIN — ONDANSETRON 4 MG: 2 INJECTION INTRAMUSCULAR; INTRAVENOUS at 07:12

## 2024-12-12 RX ADMIN — FENTANYL CITRATE 100 MCG: 50 INJECTION, SOLUTION INTRAMUSCULAR; INTRAVENOUS at 07:12

## 2024-12-12 RX ADMIN — CEFAZOLIN 2 G: 330 INJECTION, POWDER, FOR SOLUTION INTRAMUSCULAR; INTRAVENOUS at 07:12

## 2024-12-12 RX ADMIN — LIDOCAINE HYDROCHLORIDE 50 MG: 20 INJECTION, SOLUTION EPIDURAL; INFILTRATION; INTRACAUDAL; PERINEURAL at 07:12

## 2024-12-12 NOTE — ANESTHESIA PROCEDURE NOTES
Intubation    Date/Time: 12/12/2024 7:03 AM    Performed by: Alpesh Wright CRNA  Authorized by: Laine Juarez MD    Intubation:     Induction:  Intravenous    Intubated:  Postinduction    Mask Ventilation:  Easy mask    Attempts:  1    Attempted By:  CRNA    Difficult Airway Encountered?: No      Complications:  None    Airway Device:  Supraglottic airway/LMA    Airway Device Size:  4.0    Placement Verified By:  Capnometry    Complicating Factors:  None    Findings Post-Intubation:  BS equal bilateral

## 2024-12-12 NOTE — BRIEF OP NOTE
[facility] - Periop Services  Brief Operative Note    Surgery Date: 12/12/2024     Surgeons and Role:     * Russell Sevilla MD - Primary    Assisting Surgeon: Magda Morel MD     Pre-op Diagnosis:  Loose body right knee    Post-op Diagnosis:  Post-Op Diagnosis Codes:     * Loose body of right knee [M23.41]    Procedure(s) (LRB):  ARTHROSCOPY, KNEE, WITH LOOSE BODY REMOVAL (Right)    Anesthesia: General     Operative Findings: Loose body of right knee    Estimated Blood Loss: 0cc         Specimens:   Specimen (24h ago, onward)      None              Discharge Note    OUTCOME: Patient tolerated treatment/procedure well without complication and is now ready for discharge.    DISPOSITION: Home or Self Care    FINAL DIAGNOSIS: Loose body right knee, Right knee pain    FOLLOWUP: In clinic    DISCHARGE INSTRUCTIONS:    Discharge Procedure Orders   Notify your health care provider if you experience any of the following:  redness, tenderness, or signs of infection (pain, swelling, redness, odor or green/yellow discharge around incision site)     Leave dressing on - Keep it clean, dry, and intact until clinic visit     Weight bearing restrictions (specify):   Order Comments: RAY ROBERTSON

## 2024-12-12 NOTE — TRANSFER OF CARE
Anesthesia Transfer of Care Note    Patient: Nick Valente    Procedure(s) Performed: Procedure(s) (LRB):  ARTHROSCOPY, KNEE, WITH LOOSE BODY REMOVAL (Right)    Patient location: PACU    Anesthesia Type: general    Transport from OR: Transported from OR on room air with adequate spontaneous ventilation    Post assessment: no apparent anesthetic complications    Post vital signs: stable    Level of consciousness: awake    Complications: none    Transfer of care protocol was followed      Last vitals: Visit Vitals  /85   Pulse 68   Temp 36.3 °C (97.3 °F) (Temporal)   Resp 20   Ht 6' (1.829 m)   Wt 81.6 kg (179 lb 12.8 oz)   SpO2 100%   BMI 24.39 kg/m²

## 2024-12-12 NOTE — OP NOTE
Orthopedic surgery operative report      DATE OF SERVICE: 12/12/2024    Nick Valente      ATTENDING SURGEON:   Russell Sevilla MD    ASSISTANT:   Magda Morel MD    PREOPERATIVE DIAGNOSIS:   Loose body right knee     POSTOPERATIVE DIAGNOSIS:   Same    PROCEDURE PERFORMED:  1. Right knee arthroscopy and loose body removal     ESTIMATED BLOOD LOSS: 0cc.    COMPLICATIONS: None.    TOURNIQUET TIME: 29 minutes.     INDICATIONS FOR PROCEDURE: Nick Valente is a 39 y.o. year old who has had ongoing right knee pain and evidence of loose body in the knee joint on MRI. The patient has had to limit activity due to intermittent pain & occasional mechanical symptoms. The patient decided to opt for surgery after failing conservative management.    OPERATIVE REPORT: Nick Valente was initially seen in the preoperative holding area where history and physical were reviewed without change. The operative leg was marked, consents were reviewed, and any questions were answered for the patient and family. The patient was then taken back to the operating room, placed supine on the operating table with all bony prominences padded. Then a nonsterile tourniquet was placed around the upper thigh. General anesthesia was induced. The right lower extremity was prepped and draped in standard sterile fashion. A timeout led by the surgeon was performed, and preoperative antibiotics were given. The limp was exsanguinated with gravity. The tourniquet was inflated to 250mmHg.    The knee was then flexed and the inferolateral portal was created with an #11 blade scalpel.    The camera was introduced, using the blunt trocar, into the suprapatellar pouch. The undersurface of the patella was found to have grade II/III chondral wear and the trochlear groove was found to have grade II/II chondral wear . The camera was then taken down into the lateral gutter; no loose bodies were identified. The camera was then brought into the medial gutter, where an approximately  1cm loose body and plica were seen. The camera was then brought into the medial compartment.    An inferomedial portal was then localized with a spinal needle, and created using an #11 blade. The medial meniscus was probed and found to be intact.  The medial femoral condyle was found to have minimal wear. The medial tibial plateau demonstrated minimal wear.    The camera was then turned to the notch, where the previous ACL graft was found to be intact.    Then the leg was brought into figure-of-four position. The camera was brought into the lateral compartment. The lateral meniscus was probed and found to be intact. The lateral femoral condyle was found to have minmal chondral wear. The lateral tibial plateau had minimal chondral wear.    The camera was brought up into the medial compartment where the loose body was noted to be. An additional superomedial portal was made using a spinal needle to localize then an #11 blade. The loose body was visualized and removed using a hemostat. Surrounding tissue/plica was debrided from the medial compartment using a shaver.     Our attention was then turned back to the intercondylar notch. The ACL graft was probed and no additional loose bodies were identified.     The knee was then evacuated of all fluids. The portals were closed with 3-0 nylon interrupted sutures. A sterile dressing was placed, and the tourniquet was deflated.     The patient was awakened from anesthesia and taken to the postoperative care unit in stable condition.      POSTOPERATIVE PLAN:   Discharge home   Multimodal pain control   WBAT RLE  Return to clinic in 2 weeks for evaluation    Magda Morel MD  [facility] ORTHOPEDIC [facility]

## 2024-12-12 NOTE — H&P
Interval H&P    Patient seen and examined in preop holding area. No changes to history or exam other than noted below.    To OR today for right knee arthroscopy with loose body removal.           [facility]  Established Patient Office Visit  12/12/2024       Patient ID: Nick Valente  YOB: 1985  MRN: [id number]    Chief Complaint: No chief complaint on file.      Past Orthopaedic Surgeries:  Right ACL reconstruction 2001    HPI:  Nick Valente is a 39 y.o. male with history of previous right ACL reconstruction in 2001 who sustained a twisting injury to the right knee in June of 2023 while playing softball.  He reports immediate pain and swelling to the right knee and difficulty with full range of motion.  He has had continued symptoms over the left knee and has not been able to return to his previous level of activity.  He has tried to run but continues to have pain, mechanical symptoms including catching and locking of the right knee.  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  He is currently in physical therapy for his bilateral knees.  He reports left knee pain has been going on for about the past year but right bothers him more than left.    12 point ROS performed and negative except as above.     Past Medical History:    Past Medical History:   Diagnosis Date    Asthma      Past Surgical History:   Procedure Laterality Date    Right ACL repair  2021     Family History   Problem Relation Name Age of Onset    Arthritis Mother Vanessa Valente     Hyperlipidemia Mother Vanessa Valente     Hypertension Mother Vanessa Valente     Diabetes Father Nick Valente     Heart disease Father Nick Valente     Hypertension Father Nick Valente     Stroke Father Nick Valente      Social History     Socioeconomic History    Marital status: Single   Tobacco Use    Smoking status: Never    Smokeless tobacco: Never   Substance and Sexual Activity    Alcohol use: Never     Drug use: Never    Sexual activity: Not Currently     Partners: Female     Social Drivers of Health     Financial Resource Strain: Low Risk  (2/3/2024)    Overall Financial Resource Strain (CARDIA)     Difficulty of Paying Living Expenses: Not very hard   Food Insecurity: Food Insecurity Present (2/3/2024)    Hunger Vital Sign     Worried About Running Out of Food in the Last Year: Sometimes true     Ran Out of Food in the Last Year: Sometimes true   Transportation Needs: No Transportation Needs (2/3/2024)    PRAPARE - Transportation     Lack of Transportation (Medical): No     Lack of Transportation (Non-Medical): No   Physical Activity: Sufficiently Active (2/3/2024)    Exercise Vital Sign     Days of Exercise per Week: 5 days     Minutes of Exercise per Session: 150+ min   Stress: No Stress Concern Present (2/3/2024)    [employer] [place] [employer] - Occupational Stress Questionnaire     Feeling of Stress : Not at all   Housing Stability: Low Risk  (2/3/2024)    Housing Stability Vital Sign     Unable to Pay for Housing in the Last Year: No     Number of Places Lived in the Last Year: 1     Unstable Housing in the Last Year: No     Current Discharge Medication List        CONTINUE these medications which have NOT CHANGED    Details   albuterol (VENTOLIN HFA) 90 mcg/actuation inhaler Inhale 2 puffs into the lungs every 6 (six) hours as needed for Wheezing. Rescue  Qty: 18 g, Refills: 6    Associated Diagnoses: History of asthma      fexofenadine (ALLEGRA) 60 MG tablet Take 60 mg by mouth as needed.      ibuprofen (ADVIL,MOTRIN) 800 MG tablet Take 1 tablet (800 mg total) by mouth every 6 (six) hours as needed for Pain.  Qty: 20 tablet, Refills: 0      tretinoin (RETIN-A) 0.1 % cream Apply topically every evening.  Qty: 45 g, Refills: 2    Associated Diagnoses: Acne, unspecified acne type      fluticasone propionate (FLONASE) 50 mcg/actuation nasal spray 1 spray (50 mcg total) by Each Nostril route  once daily.  Qty: 18.2 mL, Refills: 3    Associated Diagnoses: Environmental allergies      HYDROcodone-acetaminophen (NORCO) 7.5-325 mg per tablet Take 1 tablet by mouth every 6 (six) hours as needed for Pain.  Qty: 12 tablet, Refills: 0    Comments: Quantity prescribed more than 7 day supply? No  Associated Diagnoses: Acute midline low back pain without sciatica      loratadine (CLARITIN) 10 mg tablet Take 1 tablet (10 mg total) by mouth once daily.  Qty: 30 tablet, Refills: 3    Associated Diagnoses: Environmental allergies           Review of patient's allergies indicates:  No Known Allergies    Physical Exam:  RLE  Skin atraumatic; previous surgical incisions well healed  No tenderness to palpation medial or lateral joint line.  TTP over anterior aspect of inferior patella  ROM 0-120 degrees flexion  Stable anterior/posterior   Stable varus/valgus  No groin pain with rotation of hip  Motor intact TA/GS/EHL/FHL  SILT SP/DP/Sa/Gillespie/T  Toes WWP      Imaging independently interpreted:  MRI right knee:  Previous ACL graft intact.  No obvious meniscal tear.  Osteochondral loose body in the anterior aspect of the intercondylar notch.    Assessment and Plan:    Nick Valente is a 39 y.o. male with history of previous right knee ACL reconstruction 2001 with recent injury to the right knee while playing softball in June 20, 2023 resulting in osteochondral loose body of the right knee.    -discussed with the patient that given minimal improvement with physical therapy and continued symptoms as well as risk of cartilage/ joint damage with loose body in his right knee, we recommend surgical intervention including right knee arthroscopy and removal of loose body.  - patient is going on in town for 3 weeks in November.  We will schedule patient for right knee arthroscopy with loose body removal on 12/12/24 with Dr. Sevilla.  - WBAT IZZY Morel MD PGY-3  [employer] Orthopaedic Surgery

## 2024-12-12 NOTE — DISCHARGE INSTRUCTIONS
Keep follow up appointment at [facility].     · Take pain medication as prescribed.     · No driving or consuming alcohol for the next 24 hours or while taking narcotic pain medicine.     · May apply ice pack to surgical area for 20 minutes at a time 6-8 times per day.     · OK to remove ace wrap in 3-5 days.  If ace wrap  removed, cover stitches with band aid.  DO NOT GET STITCHES WET!    .  Weight bearing as tolerated.  OK to walk on affected extremity.    - Notify MD of any moderate to severe pain unrelieved by pain medicine or for any signs of infection including fever above 100.4, excessive redness or swelling, yellow/green foul- smelling drainage, nausea or vomiting. Call clinic at: [phone]. After business hours, if you are unable to reach a doctor on call at [phone] or your concern is an emergency, call 911 or report to your nearest emergency room.     ·  Thanks for choosing [facility]! Have a smooth recovery!

## 2024-12-12 NOTE — ANESTHESIA POSTPROCEDURE EVALUATION
Anesthesia Post Evaluation    Patient: Nick Valente    Procedure(s) Performed: Procedure(s) (LRB):  ARTHROSCOPY, KNEE, WITH LOOSE BODY REMOVAL (Right)    Final Anesthesia Type: general      Patient location during evaluation: PACU  Patient participation: Yes- Able to Participate  Level of consciousness: awake and responds to stimulation  Post-procedure vital signs: reviewed and stable  Pain management: adequate  Airway patency: patent    PONV status at discharge: No PONV  Anesthetic complications: no      Cardiovascular status: blood pressure returned to baseline  Respiratory status: unassisted  Hydration status: euvolemic  Follow-up not needed.          Vitals Value Taken Time   /82 12/12/24 0945   Temp 35.8 °C (96.4 °F) 12/12/24 0830   Pulse 73 12/12/24 0950   Resp 16 12/12/24 0912   SpO2 96 % 12/12/24 0950   Vitals shown include unfiled device data.      Event Time   Out of Recovery 08:25:00         Pain/Gabbie Score: Pain Rating Prior to Med Admin: 8 (12/12/2024  9:12 AM)  Pain Rating Post Med Admin: 5 (12/12/2024  9:50 AM)  Gabbie Score: 10 (12/12/2024  8:30 AM)  Modified Gabbie Score: 19 (12/12/2024  9:50 AM)

## 2024-12-12 NOTE — PROGRESS NOTES
Faculty Attestation: I was present and scrubbed throughout the key elements of the procedure.  I agree with the resident's findings and description.    Sourav Sevilla  Orthopaedic Surgery

## 2024-12-16 VITALS
SYSTOLIC BLOOD PRESSURE: 144 MMHG | BODY MASS INDEX: 24.35 KG/M2 | HEART RATE: 65 BPM | OXYGEN SATURATION: 94 % | HEIGHT: 72 IN | WEIGHT: 179.81 LBS | TEMPERATURE: 96 F | DIASTOLIC BLOOD PRESSURE: 82 MMHG | RESPIRATION RATE: 16 BRPM

## 2024-12-18 DIAGNOSIS — M25.561 CHRONIC PAIN OF RIGHT KNEE: ICD-10-CM

## 2024-12-18 DIAGNOSIS — G89.29 CHRONIC PAIN OF RIGHT KNEE: ICD-10-CM

## 2024-12-18 NOTE — TELEPHONE ENCOUNTER
----- Message from Lulú sent at 12/18/2024  2:30 PM CST -----  Regarding: RES 2, Med refill.  Pt called to request a med refill on his Oxycodone 5mg. Please advise

## 2024-12-20 RX ORDER — OXYCODONE HYDROCHLORIDE 5 MG/1
5 TABLET ORAL EVERY 6 HOURS PRN
Qty: 15 TABLET | Refills: 0 | Status: SHIPPED | OUTPATIENT
Start: 2024-12-20

## 2024-12-20 NOTE — TELEPHONE ENCOUNTER
Patient needs medication to be refilled Dr scott unavailable to send it and Dr Morel can not escribe.    Can you please send rx to his pharmacy?

## 2024-12-23 ENCOUNTER — OFFICE VISIT (OUTPATIENT)
Dept: ORTHOPEDICS | Facility: CLINIC | Age: 39
End: 2024-12-23
Payer: MEDICAID

## 2024-12-23 VITALS
SYSTOLIC BLOOD PRESSURE: 127 MMHG | HEIGHT: 72 IN | TEMPERATURE: 98 F | DIASTOLIC BLOOD PRESSURE: 85 MMHG | HEART RATE: 56 BPM | BODY MASS INDEX: 24.42 KG/M2 | OXYGEN SATURATION: 97 % | WEIGHT: 180.31 LBS | RESPIRATION RATE: 20 BRPM

## 2024-12-23 DIAGNOSIS — M23.41 LOOSE BODY OF RIGHT KNEE: Primary | ICD-10-CM

## 2024-12-23 PROCEDURE — 99024 POSTOP FOLLOW-UP VISIT: CPT | Mod: ,,, | Performed by: SPECIALIST

## 2024-12-23 PROCEDURE — 3079F DIAST BP 80-89 MM HG: CPT | Mod: CPTII,,, | Performed by: SPECIALIST

## 2024-12-23 PROCEDURE — 3044F HG A1C LEVEL LT 7.0%: CPT | Mod: CPTII,,, | Performed by: SPECIALIST

## 2024-12-23 PROCEDURE — 3074F SYST BP LT 130 MM HG: CPT | Mod: CPTII,,, | Performed by: SPECIALIST

## 2024-12-23 PROCEDURE — 1159F MED LIST DOCD IN RCRD: CPT | Mod: CPTII,,, | Performed by: SPECIALIST

## 2024-12-23 PROCEDURE — 99214 OFFICE O/P EST MOD 30 MIN: CPT | Mod: PBBFAC

## 2024-12-23 NOTE — PROGRESS NOTES
Past Medical History:   Diagnosis Date    Asthma        Past Surgical History:   Procedure Laterality Date    ARTHROSCOPY, KNEE, WITH LOOSE BODY REMOVAL Right 12/12/2024    Procedure: ARTHROSCOPY, KNEE, WITH LOOSE BODY REMOVAL;  Surgeon: Russell Sevilla MD;  Location: [facility];  Service: Orthopedics;  Laterality: Right;  Linvate    Right ACL repair  2021       Current Outpatient Medications   Medication Sig    acetaminophen (TYLENOL) 500 MG tablet Take 2 tablets (1,000 mg total) by mouth every 8 (eight) hours.    albuterol (VENTOLIN HFA) 90 mcg/actuation inhaler Inhale 2 puffs into the lungs every 6 (six) hours as needed for Wheezing. Rescue    fexofenadine (ALLEGRA) 60 MG tablet Take 60 mg by mouth as needed.    fluticasone propionate (FLONASE) 50 mcg/actuation nasal spray 1 spray (50 mcg total) by Each Nostril route once daily.    ibuprofen (ADVIL,MOTRIN) 800 MG tablet Take 1 tablet (800 mg total) by mouth every 6 (six) hours as needed for Pain.    loratadine (CLARITIN) 10 mg tablet Take 1 tablet (10 mg total) by mouth once daily.    tretinoin (RETIN-A) 0.1 % cream Apply topically every evening.    HYDROcodone-acetaminophen (NORCO) 7.5-325 mg per tablet Take 1 tablet by mouth every 6 (six) hours as needed for Pain. (Patient not taking: Reported on 12/23/2024)    meloxicam (MOBIC) 15 MG tablet Take 1 tablet (15 mg total) by mouth once daily. (Patient not taking: Reported on 12/23/2024)    oxyCODONE (ROXICODONE) 5 MG immediate release tablet Take 1 tablet (5 mg total) by mouth every 6 (six) hours as needed for Pain. (Patient not taking: Reported on 12/23/2024)     No current facility-administered medications for this visit.       Review of patient's allergies indicates:  No Known Allergies    Family History   Problem Relation Name Age of Onset    Arthritis Mother Vanessa Valente     Hyperlipidemia Mother Vanessa Valente     Hypertension Mother Vanessa Valente     Diabetes Father Nick Valente     Heart disease Father Ncik Valente      Hypertension Father Nick Valente     Stroke Father Nick Valente        Social History     Socioeconomic History    Marital status: Single   Tobacco Use    Smoking status: Never    Smokeless tobacco: Never   Substance and Sexual Activity    Alcohol use: Never    Drug use: Never    Sexual activity: Not Currently     Partners: Female     Social Drivers of Health     Financial Resource Strain: Low Risk  (2/3/2024)    Overall Financial Resource Strain (CARDIA)     Difficulty of Paying Living Expenses: Not very hard   Food Insecurity: Food Insecurity Present (2/3/2024)    Hunger Vital Sign     Worried About Running Out of Food in the Last Year: Sometimes true     Ran Out of Food in the Last Year: Sometimes true   Transportation Needs: No Transportation Needs (2/3/2024)    PRAPARE - Transportation     Lack of Transportation (Medical): No     Lack of Transportation (Non-Medical): No   Physical Activity: Sufficiently Active (2/3/2024)    Exercise Vital Sign     Days of Exercise per Week: 5 days     Minutes of Exercise per Session: 150+ min   Stress: No Stress Concern Present (2/3/2024)    [employer] [place] [employer] - Occupational Stress Questionnaire     Feeling of Stress : Not at all   Housing Stability: Low Risk  (2/3/2024)    Housing Stability Vital Sign     Unable to Pay for Housing in the Last Year: No     Number of Places Lived in the Last Year: 1     Unstable Housing in the Last Year: No       Chief Complaint:   Chief Complaint   Patient presents with    Right Knee - Post-op Evaluation, Pain     Loose body removal 12/12/24 pain 2/10       Consulting Physician: No ref. provider found    History of present illness:    This is a 39 y.o. year old male who is doing well with no pain in the right knee 2 weeks postop from right knee arthroscopic loose body removal.  He would like to start doing intense runs and sprints again.  I have asked him to wait 2 additional weeks.  He is ambulating unassisted with no  problems.    Review of Systems:    Constitution:   Denies chills, fever, and sweats.  HENT:   Denies headaches or blurry vision.  Cardiovascular:  Denies chest pain or irregular heart beat.  Respiratory:   Denies cough or shortness of breath.  Gastrointestinal:  Denies abdominal pain, nausea, or vomiting.  Musculoskeletal:   Denies muscle cramps.  Neurological:   Denies dizziness or focal weakness.  Psychiatric/Behavior: Normal mental status.  Hematology/Lymph:  Denies bleeding problem or easy bruising/bleeding.  Skin:    Denies rash or suspicious lesions.    Examination:    Vital Signs:    Vitals:    12/23/24 0844 12/23/24 0845   BP:  127/85   Pulse:  (!) 56   Resp:  20   Temp:  98 °F (36.7 °C)   TempSrc:  Oral   SpO2:  97%   Weight:  81.8 kg (180 lb 5.4 oz)   Height:  6' (1.829 m)   PainSc:   2        Body mass index is 24.46 kg/m².    Constitution:   Well-developed, well nourished patient in no acute distress.  Neurological:   Alert and oriented x 3 and cooperative to examination.     Psychiatric/Behavior: Normal mental status.  Respiratory:   No shortness of breath.non labored breathing.  Cardiovascular: Regular rate and rhythm  Eyes:    Extraoccular muscles intact  Skin:    No scars, rash or suspicious lesions.    Physical Exam:  Right knee exam:   Well-healed incisions   No redness, no swelling, no effusion, no increased heat   Full active and passive range motion   No pain or swelling to palpation, or weight-bearing, or range of motion   No pathologic laxity of the cruciate or collateral ligaments   Full active and passive range motion from 0° to 145°   2+ pulses with intact sensory and motor function            Assessment: Loose body of right knee        Plan:  Sutures removed Steri-Strips applied   Resume regular activity and he will wait 2 additional weeks to resume running and sprinting   Follow up p.r.n.      DISCLAIMER: This note may have been dictated using voice recognition software and may contain  grammatical errors.     NOTE: Consult report sent to referring provider via [employer] EMR.     [employer]

## 2025-04-24 ENCOUNTER — HOSPITAL ENCOUNTER (EMERGENCY)
Facility: HOSPITAL | Age: 40
Discharge: HOME | End: 2025-04-24
Attending: FAMILY MEDICINE
Payer: MEDICAID

## 2025-04-24 VITALS
TEMPERATURE: 99 F | WEIGHT: 180 LBS | DIASTOLIC BLOOD PRESSURE: 77 MMHG | HEART RATE: 77 BPM | OXYGEN SATURATION: 98 % | BODY MASS INDEX: 24.38 KG/M2 | RESPIRATION RATE: 17 BRPM | HEIGHT: 72 IN | SYSTOLIC BLOOD PRESSURE: 120 MMHG

## 2025-04-24 DIAGNOSIS — R06.6 HICCUPS: Primary | ICD-10-CM

## 2025-04-24 PROCEDURE — 99283 EMERGENCY DEPT VISIT LOW MDM: CPT

## 2025-04-24 RX ORDER — BACLOFEN 10 MG/1
10 TABLET ORAL 3 TIMES DAILY PRN
Qty: 30 TABLET | Refills: 0 | Status: SHIPPED | OUTPATIENT
Start: 2025-04-24

## 2025-04-24 NOTE — ED PROVIDER NOTES
Encounter Date: 4/24/2025       History     Chief Complaint   Patient presents with    Hiccups     X3 days, has resolved with baclofen before     The patient presents with occasional hiccups for the last 3 days. He reports occasional hiccups due to stress. He denies any pain, chest pain, and shortness of breath. No other complaints at this time. He requests a prescription for baclofen which has resolved his symptoms in the past.      Review of patient's allergies indicates:  No Known Allergies  Past Medical History:   Diagnosis Date    Asthma      Past Surgical History:   Procedure Laterality Date    ARTHROSCOPY, KNEE, WITH LOOSE BODY REMOVAL Right 12/12/2024    Procedure: ARTHROSCOPY, KNEE, WITH LOOSE BODY REMOVAL;  Surgeon: Russell Sevilla MD;  Location: [facility];  Service: Orthopedics;  Laterality: Right;  Linvate    Right ACL repair  2021     Family History   Problem Relation Name Age of Onset    Arthritis Mother Vanessa Valente     Hyperlipidemia Mother Vanessa Valente     Hypertension Mother Vanessa Valente     Diabetes Father Nick Valente     Heart disease Father Nick Valente     Hypertension Father Nick Valente     Stroke Father Nick Valente      Social History[1]  Review of Systems   Constitutional:  Negative for fever.   HENT:  Negative for sore throat.    Respiratory:  Negative for shortness of breath.    Cardiovascular:  Negative for chest pain.   Gastrointestinal:  Negative for nausea.   Genitourinary:  Negative for dysuria.   Musculoskeletal:  Negative for back pain.   Skin:  Negative for rash.   Neurological:  Negative for weakness.   Hematological:  Does not bruise/bleed easily.   All other systems reviewed and are negative.      Physical Exam     Initial Vitals [04/24/25 0018]   BP Pulse Resp Temp SpO2   120/77 77 17 98.6 °F (37 °C) 98 %      MAP       --         Physical Exam    Nursing note and vitals reviewed.  Constitutional: He appears well-developed and well-nourished.   HENT:   Head: Normocephalic and atraumatic.   Neck:  Neck supple.   Normal range of motion.  Cardiovascular:  Normal rate, regular rhythm, normal heart sounds and intact distal pulses.           Pulmonary/Chest: Effort normal and breath sounds normal. He has no decreased breath sounds.   Abdominal: Abdomen is soft and flat. Bowel sounds are normal. There is no abdominal tenderness.   Musculoskeletal:         General: Normal range of motion.      Cervical back: Normal range of motion and neck supple.     Neurological: He is alert and oriented to person, place, and time. He has normal strength.   Skin: Skin is warm and dry.   Psychiatric: He has a normal mood and affect.         ED Course   Procedures  Labs Reviewed - No data to display       Imaging Results    None          Medications - No data to display  Medical Decision Making  The patient presents with occasional hiccups for the last 3 days. He reports occasional hiccups due to stress. He denies any pain, chest pain, and shortness of breath. No other complaints at this time. He requests a prescription for baclofen which has resolved his symptoms in the past.    Will prescribe baclofen and follow up with her pcp.12:34 AM DISPOSITION: The patient is resting comfortably in no acute distress.  He is hemodynamically stable and is without objective evidence for acute process requiring urgent intervention or hospitalization. I provided counseling to patient with regard to condition, the treatment plan, specific conditions for return, and the importance of follow up. Detailed written and verbal instructions provided to patient and he expressed a verbal understanding of the discharge instructions and overall management plan. Reiterated the importance of medication administration and safety and advised patient to follow up with primary care provider in 3-5 days or sooner if needed.  Answered questions at this time. The patient is stable for discharge.         Additional MDM:   Differential Diagnosis:   Hiccups ,  gastroenteritis, appendicitis, Diverticulitis, Pancreatitis, Pyelonephritis, AAA, Dissection, MI, Gastric Ulcer, Peptic Ulcer, Urinary retention, among others                 ED Course as of 04/24/25 0035   Thu Apr 24, 2025   0033 Given strict ED return precautions. I have spoken with the patient and/or caregivers. I have explained the patient's condition, diagnoses and treatment plan based on the information available to me at this time. I have answered the patient's and/or caregiver's questions and addressed any concerns. The patient and/or caregivers have as good an understanding of the patient's diagnosis, condition and treatment plan as can be expected at this point. The vital signs have been stable. The patient's condition is stable and appropriate for discharge from the emergency department.      The patient will pursue further outpatient evaluation with the primary care physician or other designated or consulting physician as outlined in the discharge instructions. The patient and/or caregivers are agreeable to this plan of care and follow-up instructions have been explained in detail. The patient and/or caregivers have received these instructions in written format and have expressed an understanding of the discharge instructions. The patient and/or caregivers are aware that any significant change in condition or worsening of symptoms should prompt an immediate return to this or the closest emergency department or a call to 911.      [RB]      ED Course User Index  [RB] Beto Bardales ACNP                           Clinical Impression:  Final diagnoses:  [R06.6] Hiccups (Primary)          ED Disposition Condition    Discharge Good          ED Prescriptions       Medication Sig Dispense Start Date End Date Auth. Provider    baclofen (LIORESAL) 10 MG tablet Take 1 tablet (10 mg total) by mouth 3 (three) times daily as needed (hiccups). May cause drowsiness 30 tablet 4/24/2025 -- Beto Bardales ACNP           Follow-up Information       Follow up With Specialties Details Why Contact Info    Alecia Mg NP Family Medicine In 3 days  [address] [facility] [id number] [phone]      [facility] Emergency Medicine  If symptoms worsen [address] [facility] [address] [phone]               [1]   Social History  Tobacco Use    Smoking status: Never    Smokeless tobacco: Never   Substance Use Topics    Alcohol use: Never    Drug use: Never        Beto Bardales ACNP  04/24/25 0035

## (undated) DEVICE — TUBING MEDI-VAC 20FT .25IN

## (undated) DEVICE — GOWN POLY REINF BRTH SLV XL

## (undated) DEVICE — DRESSING GAUZE XEROFORM 5X9

## (undated) DEVICE — GLOVE SENSICARE PI GRN 6.5

## (undated) DEVICE — PAD PREP CUFFED NS 24X48IN

## (undated) DEVICE — SPONGE GAUZE 4X4 12 PLY STRL

## (undated) DEVICE — GLOVE SIGNATURE MICRO LTX 8

## (undated) DEVICE — SUT ETHILON 3-0 FS-1 30

## (undated) DEVICE — ALCOHOL 70% ISO RUBBING 16OZ

## (undated) DEVICE — DRAPE INCISE IOBAN 2 13X13IN

## (undated) DEVICE — BLADE GREAT WHITE 4.2 6/BX

## (undated) DEVICE — BANDAGE ESMARK 6X12

## (undated) DEVICE — SOL NACL IRR 3000ML

## (undated) DEVICE — APPLICATOR CHLORAPREP ORN 26ML

## (undated) DEVICE — GLOVE SENSICARE NEOPRENE 6.5

## (undated) DEVICE — PACK SURGICAL KNEE SCOPE

## (undated) DEVICE — GLOVE SENSICARE PI GRN 7.5

## (undated) DEVICE — KIT SURGICAL TURNOVER

## (undated) DEVICE — GLOVE SENSICARE NEOPRENE 7.5

## (undated) DEVICE — ELECTRODE PATIENT RETURN DISP

## (undated) DEVICE — GLOVE SENSICARE PI GRN 8

## (undated) DEVICE — GLOVE SIGNATURE MICRO LTX 6.5

## (undated) DEVICE — GLOVE SENSICARE PI GRN 7

## (undated) DEVICE — POSITIONER HEAD SUPINE PINK

## (undated) DEVICE — TUBE SET INFLOW/OUTFLOW